# Patient Record
Sex: FEMALE | Race: WHITE | NOT HISPANIC OR LATINO | Employment: FULL TIME | URBAN - METROPOLITAN AREA
[De-identification: names, ages, dates, MRNs, and addresses within clinical notes are randomized per-mention and may not be internally consistent; named-entity substitution may affect disease eponyms.]

---

## 2017-01-09 ENCOUNTER — PRENATAL OFFICE VISIT (OUTPATIENT)
Dept: MIDWIFE SERVICES | Facility: CLINIC | Age: 37
End: 2017-01-09
Payer: COMMERCIAL

## 2017-01-09 VITALS
WEIGHT: 161 LBS | DIASTOLIC BLOOD PRESSURE: 60 MMHG | OXYGEN SATURATION: 99 % | HEIGHT: 68 IN | BODY MASS INDEX: 24.4 KG/M2 | RESPIRATION RATE: 16 BRPM | SYSTOLIC BLOOD PRESSURE: 120 MMHG | TEMPERATURE: 97.2 F | HEART RATE: 120 BPM

## 2017-01-09 DIAGNOSIS — Z34.82 OTHER NORMAL PREGNANCY, NOT FIRST, SECOND TRIMESTER: Primary | ICD-10-CM

## 2017-01-09 PROCEDURE — 99207 ZZC PRENATAL VISIT: CPT | Performed by: ADVANCED PRACTICE MIDWIFE

## 2017-01-09 NOTE — PROGRESS NOTES
19w5d  Feeling much better. Starting to feel her baby move. Wants to take the 3 hour GTT r/t failing the GCT during first pregnancy. Ordered as future. Has [facility] level II survey this Wednesday. MIGUEL AJ

## 2017-01-09 NOTE — NURSING NOTE
"Chief Complaint   Patient presents with     Prenatal Care     initial /60 mmHg  Pulse 120  Temp(Src) 97.2  F (36.2  C) (Oral)  Resp 16  Ht 5' 8\" (1.727 m)  Wt 161 lb (73.029 kg)  BMI 24.49 kg/m2  SpO2 99%  LMP 08/24/2016 Estimated body mass index is 24.49 kg/(m^2) as calculated from the following:    Height as of this encounter: 5' 8\" (1.727 m).    Weight as of this encounter: 161 lb (73.029 kg).  BP completed using cuff size: regular.  L  arm      Health Maintenance that is potentially due pending provider review:  NONE    n/a    Daniele Gallegos ma  "

## 2017-01-11 ENCOUNTER — HOSPITAL ENCOUNTER (OUTPATIENT)
Dept: ULTRASOUND IMAGING | Facility: CLINIC | Age: 37
Discharge: HOME | End: 2017-01-11
Attending: OBSTETRICS & GYNECOLOGY | Admitting: OBSTETRICS & GYNECOLOGY
Payer: COMMERCIAL

## 2017-01-11 ENCOUNTER — OFFICE VISIT (OUTPATIENT)
Dept: MATERNAL FETAL MEDICINE | Facility: CLINIC | Age: 37
End: 2017-01-11
Attending: OBSTETRICS & GYNECOLOGY
Payer: COMMERCIAL

## 2017-01-11 DIAGNOSIS — O09.522 AMA (ADVANCED MATERNAL AGE) MULTIGRAVIDA 35+, SECOND TRIMESTER: Primary | ICD-10-CM

## 2017-01-11 DIAGNOSIS — O09.529 AMA (ADVANCED MATERNAL AGE) MULTIGRAVIDA 35+, UNSPECIFIED TRIMESTER: ICD-10-CM

## 2017-01-11 PROCEDURE — 76811 OB US DETAILED SNGL FETUS: CPT

## 2017-01-11 NOTE — PROGRESS NOTES
"Please see \"Imaging\" tab under \"Chart Review\" for details of today's US at the [facility].    Neo Green MD  Maternal-Fetal Medicine      "

## 2017-01-13 ENCOUNTER — TELEPHONE (OUTPATIENT)
Dept: MATERNAL FETAL MEDICINE | Facility: CLINIC | Age: 37
End: 2017-01-13

## 2017-01-13 NOTE — TELEPHONE ENCOUNTER
Carmen had called our office yesterday stating that she received a letter (not a bill yet) from [employer] that the [employer] was not covered because [facility] is apparently out of network.  She said that genetic testing was covered, but the lab facility apparently was out of network.  I called the testing lab ([facility]) to inquire further and was informed that they are currently working on an appeal to [employer] and that it may take some time before the billing is sorted out.  They also informed me that [facility] lab should be considered in-network with [employer].  I also reminded Carmen that when she finally receives a bill for the testing, she can call [facility] lab and ask for a discounted rate which would bring her potential out of pocket even lower.  I encouraged Carmen to call me back if she has further questions.      Lynn Wilson MS, [facility]  Certified Genetic Counselor  January 13, 2017  9:24 AM

## 2017-02-20 ENCOUNTER — PRENATAL OFFICE VISIT (OUTPATIENT)
Dept: MIDWIFE SERVICES | Facility: CLINIC | Age: 37
End: 2017-02-20
Payer: COMMERCIAL

## 2017-02-20 VITALS
TEMPERATURE: 98 F | DIASTOLIC BLOOD PRESSURE: 62 MMHG | HEIGHT: 68 IN | BODY MASS INDEX: 25.58 KG/M2 | HEART RATE: 78 BPM | WEIGHT: 168.8 LBS | SYSTOLIC BLOOD PRESSURE: 102 MMHG

## 2017-02-20 DIAGNOSIS — Z34.82 ENCOUNTER FOR SUPERVISION OF OTHER NORMAL PREGNANCY IN SECOND TRIMESTER: Primary | ICD-10-CM

## 2017-02-20 LAB — HGB BLD-MCNC: 11.6 G/DL (ref 11.7–15.7)

## 2017-02-20 PROCEDURE — 82950 GLUCOSE TEST: CPT | Performed by: ADVANCED PRACTICE MIDWIFE

## 2017-02-20 PROCEDURE — 99207 ZZC PRENATAL VISIT: CPT | Performed by: ADVANCED PRACTICE MIDWIFE

## 2017-02-20 PROCEDURE — 36415 COLL VENOUS BLD VENIPUNCTURE: CPT | Performed by: ADVANCED PRACTICE MIDWIFE

## 2017-02-20 PROCEDURE — 00000218 ZZHCL STATISTIC OBHBG - HEMOGLOBIN: Performed by: ADVANCED PRACTICE MIDWIFE

## 2017-02-20 NOTE — NURSING NOTE
"No chief complaint on file.      Initial /62  Pulse 78  Temp 98  F (36.7  C)  Ht 5' 8\" (1.727 m)  Wt 168 lb 12.8 oz (76.6 kg)  LMP 08/24/2016  BMI 25.67 kg/m2 Estimated body mass index is 25.67 kg/(m^2) as calculated from the following:    Height as of this encounter: 5' 8\" (1.727 m).    Weight as of this encounter: 168 lb 12.8 oz (76.6 kg).  Medication Reconciliation: complete    "

## 2017-02-20 NOTE — PROGRESS NOTES
25w5d  Patient feeling well. Positive fetal movement. Denies water leaking, vaginal bleeding, decreased fetal movement, contraction pain, or headaches.   Patient is doing well. Not too many questions today. She plans to start to think about the birth but since she still has time has been concentrating more on her son at home than this pregnancy.   Decided to do the GCT instead of the GTT. Doing that today and HGB.   Danger signs reviewed, pre-eclampsia signs and symptoms discussed.   Knows when to call triage and has phone numbers.   Follow up in 4 weeks.   Chantell HOBSON

## 2017-02-20 NOTE — MR AVS SNAPSHOT
"              After Visit Summary   2017    Carmen Dorsey    MRN: [id number]           Patient Information     Date Of Birth          1980        Visit Information        Provider Department      2017 3:30 PM Chantell Dey APRN CNM [facility] [place]        Today's Diagnoses     Encounter for supervision of other normal pregnancy in second trimester    -  1       Follow-ups after your visit        Who to contact     If you have questions or need follow up information about today's clinic visit or your schedule please contact [facility] [place] directly at [phone].  Normal or non-critical lab and imaging results will be communicated to you by 1Energy Systemshart, letter or phone within 4 business days after the clinic has received the results. If you do not hear from us within 7 days, please contact the clinic through 1[employer] Systemshart or phone. If you have a critical or abnormal lab result, we will notify you by phone as soon as possible.  Submit refill requests through [employer] or call your pharmacy and they will forward the refill request to us. Please allow 3 business days for your refill to be completed.          Additional Information About Your Visit        MyChart Information     [employer] lets you send messages to your doctor, view your test results, renew your prescriptions, schedule appointments and more. To sign up, go to [email or web address] . Click on \"Log in\" on the left side of the screen, which will take you to the Welcome page. Then click on \"Sign up Now\" on the right side of the page.     You will be asked to enter the access code listed below, as well as some personal information. Please follow the directions to create your username and password.     Your access code is: [email or web address]  Expires: 2017  3:47 PM     Your access code will  in 90 days. If you need help or a new code, please call your [facility] or [phone].        Care EveryWhere ID     This " "is your Care EveryWhere ID. This could be used by other organizations to access your [place] medical records  [id number]        Your Vitals Were     Pulse Temperature Height Last Period BMI (Body Mass Index)       78 98  F (36.7  C) 5' 8\" (1.727 m) 08/24/2016 25.67 kg/m2        Blood Pressure from Last 3 Encounters:   02/20/17 102/62   01/09/17 120/60   12/05/16 116/71    Weight from Last 3 Encounters:   02/20/17 168 lb 12.8 oz (76.6 kg)   01/09/17 161 lb (73 kg)   12/05/16 152 lb (68.9 kg)              We Performed the Following     Glucose tolerance, gest screen, 1 hour     OB hemoglobin        Primary Care Provider Office Phone # Fax #    JENA Young [facility] [phone]       [facility] [address] [facility] [address]  [facility] [address]        Thank you!     Thank you for choosing [facility] [place]  for your care. Our goal is always to provide you with excellent care. Hearing back from our patients is one way we can continue to improve our services. Please take a few minutes to complete the written survey that you may receive in the mail after your visit with us. Thank you!             Your Updated Medication List - Protect others around you: Learn how to safely use, store and throw away your medicines at www.disposemymeds.org.          This list is accurate as of: 2/20/17  3:47 PM.  Always use your most recent med list.                   Brand Name Dispense Instructions for use    EPINEPHrine 0.3 MG/0.3ML injection     0.6 mL    Inject 0.3 mLs (0.3 mg) into the muscle as needed for anaphylaxis       FAMCICLOVIR PO      Take 250 mg by mouth       miconazole 2 % cream    CVS MICONAZOLE 7    45 g    Place 1 applicator vaginally At Bedtime       TRIAMCINOLONE ACETONIDE (TOP) 0.1 % Crea     60    apply to area three times per day       * VALTREX 500 MG tablet   Generic drug:  valACYclovir     30    4 tablets in in AM and 4 tablets in PM for one day prn cold sore       * " valACYclovir 1000 mg tablet    VALTREX    4 tablet    Take 2 tablets (2,000 mg) by mouth 2 times daily       * valACYclovir 500 MG tablet    VALTREX    20 tablet    Take 1 tablet (500 mg) by mouth 2 times daily       * Notice:  This list has 3 medication(s) that are the same as other medications prescribed for you. Read the directions carefully, and ask your doctor or other care provider to review them with you.

## 2017-02-21 LAB — GLUCOSE 1H P 50 G GLC PO SERPL-MCNC: 124 MG/DL (ref 60–129)

## 2017-03-27 ENCOUNTER — PRENATAL OFFICE VISIT (OUTPATIENT)
Dept: MIDWIFE SERVICES | Facility: CLINIC | Age: 37
End: 2017-03-27
Payer: COMMERCIAL

## 2017-03-27 VITALS
DIASTOLIC BLOOD PRESSURE: 70 MMHG | WEIGHT: 171.6 LBS | HEIGHT: 68 IN | BODY MASS INDEX: 26.01 KG/M2 | HEART RATE: 79 BPM | SYSTOLIC BLOOD PRESSURE: 126 MMHG

## 2017-03-27 DIAGNOSIS — Z34.83 OTHER NORMAL PREGNANCY, NOT FIRST, THIRD TRIMESTER: Primary | ICD-10-CM

## 2017-03-27 PROCEDURE — 99207 ZZC PRENATAL VISIT: CPT | Performed by: ADVANCED PRACTICE MIDWIFE

## 2017-03-27 NOTE — MR AVS SNAPSHOT
"              After Visit Summary   3/27/2017    Carmen Dorsey    MRN: [id number]           Patient Information     Date Of Birth          1980        Visit Information        Provider Department      3/27/2017 1:15 PM Gretchen Washington APRN CNM [facility] [place]        Today's Diagnoses     Other normal pregnancy, not first, third trimester    -  1       Follow-ups after your visit        Who to contact     If you have questions or need follow up information about today's clinic visit or your schedule please contact [facility] [place] directly at [phone].  Normal or non-critical lab and imaging results will be communicated to you by Fre[employer]inhart, letter or phone within 4 business days after the clinic has received the results. If you do not hear from us within 7 days, please contact the clinic through Freedu.inhart or phone. If you have a critical or abnormal lab result, we will notify you by phone as soon as possible.  Submit refill requests through [employer] or call your pharmacy and they will forward the refill request to us. Please allow 3 business days for your refill to be completed.          Additional Information About Your Visit        MyChart Information     [employer] lets you send messages to your doctor, view your test results, renew your prescriptions, schedule appointments and more. To sign up, go to [email or web address] . Click on \"Log in\" on the left side of the screen, which will take you to the Welcome page. Then click on \"Sign up Now\" on the right side of the page.     You will be asked to enter the access code listed below, as well as some personal information. Please follow the directions to create your username and password.     Your access code is: [email or web address]  Expires: 2017  4:47 PM     Your access code will  in 90 days. If you need help or a new code, please call your [facility] or [phone].        Care EveryWhere ID     This is your Care " "EveryWhere ID. This could be used by other organizations to access your [place] medical records  [id number]        Your Vitals Were     Pulse Height Last Period BMI (Body Mass Index)          79 5' 8\" (1.727 m) 08/24/2016 26.09 kg/m2         Blood Pressure from Last 3 Encounters:   03/27/17 126/70   02/20/17 102/62   01/09/17 120/60    Weight from Last 3 Encounters:   03/27/17 171 lb 9.6 oz (77.8 kg)   02/20/17 168 lb 12.8 oz (76.6 kg)   01/09/17 161 lb (73 kg)              Today, you had the following     No orders found for display       Primary Care Provider Office Phone # Fax #    JENA Young YONG [phone]       [facility] [address] [facility] [address]  [facility] [address]        Thank you!     Thank you for choosing [facility]  for your care. Our goal is always to provide you with excellent care. Hearing back from our patients is one way we can continue to improve our services. Please take a few minutes to complete the written survey that you may receive in the mail after your visit with us. Thank you!             Your Updated Medication List - Protect others around you: Learn how to safely use, store and throw away your medicines at www.disposemymeds.org.          This list is accurate as of: 3/27/17  1:53 PM.  Always use your most recent med list.                   Brand Name Dispense Instructions for use    EPINEPHrine 0.3 MG/0.3ML injection     0.6 mL    Inject 0.3 mLs (0.3 mg) into the muscle as needed for anaphylaxis       FAMCICLOVIR PO      Take 250 mg by mouth       miconazole 2 % cream    CVS MICONAZOLE 7    45 g    Place 1 applicator vaginally At Bedtime       TRIAMCINOLONE ACETONIDE (TOP) 0.1 % Crea     60    apply to area three times per day       * VALTREX 500 MG tablet   Generic drug:  valACYclovir     30    4 tablets in in AM and 4 tablets in PM for one day prn cold sore       * valACYclovir 1000 mg tablet    VALTREX    4 tablet    Take 2 tablets " (2,000 mg) by mouth 2 times daily       * valACYclovir 500 MG tablet    VALTREX    20 tablet    Take 1 tablet (500 mg) by mouth 2 times daily       * Notice:  This list has 3 medication(s) that are the same as other medications prescribed for you. Read the directions carefully, and ask your doctor or other care provider to review them with you.

## 2017-03-27 NOTE — PROGRESS NOTES
30w5d  Here with son, Naun. Leaving this week for a trip to [place]. Discussed travel precautions. Gave phone # to call the birthplace for a tour. Has questions about making a plan for breastfeeding. Reports a history of breast reduction surgery and low milk production while breastfeeding first son. She was using an industrial pump and dom peridon with good results last time. Wants to secure similar resources with this baby, too. I will discuss with LC from hospital about a plan. Reports good fetal movement. Denies leaking of fluid, vaginal bleeding, regular uterine contractions, headache or other concerns.  RTC in 2 wks YOSVANY

## 2017-04-10 ENCOUNTER — PRENATAL OFFICE VISIT (OUTPATIENT)
Dept: MIDWIFE SERVICES | Facility: CLINIC | Age: 37
End: 2017-04-10
Payer: COMMERCIAL

## 2017-04-10 VITALS
HEART RATE: 107 BPM | WEIGHT: 171 LBS | DIASTOLIC BLOOD PRESSURE: 70 MMHG | BODY MASS INDEX: 26 KG/M2 | SYSTOLIC BLOOD PRESSURE: 120 MMHG

## 2017-04-10 DIAGNOSIS — Z23 NEED FOR TDAP VACCINATION: Primary | ICD-10-CM

## 2017-04-10 PROCEDURE — 90715 TDAP VACCINE 7 YRS/> IM: CPT | Performed by: ADVANCED PRACTICE MIDWIFE

## 2017-04-10 PROCEDURE — 90471 IMMUNIZATION ADMIN: CPT | Performed by: ADVANCED PRACTICE MIDWIFE

## 2017-04-10 PROCEDURE — 99207 ZZC PRENATAL VISIT: CPT | Performed by: ADVANCED PRACTICE MIDWIFE

## 2017-04-10 NOTE — MR AVS SNAPSHOT
"              After Visit Summary   4/10/2017    Carmen Dorsey    MRN: [id number]           Patient Information     Date Of Birth          1980        Visit Information        Provider Department      4/10/2017 2:00 PM Aida Genao APRN [facility]        Today's Diagnoses     Need for Tdap vaccination    -  1       Follow-ups after your visit        Follow-up notes from your care team     Return in about 2 weeks (around 2017).      Who to contact     If you have questions or need follow up information about today's clinic visit or your schedule please contact [facility] directly at [phone].  Normal or non-critical lab and imaging results will be communicated to you by MyChart, letter or phone within 4 business days after the clinic has received the results. If you do not hear from us within 7 days, please contact the clinic through MyChart or phone. If you have a critical or abnormal lab result, we will notify you by phone as soon as possible.  Submit refill requests through [employer] or call your pharmacy and they will forward the refill request to us. Please allow 3 business days for your refill to be completed.          Additional Information About Your Visit        MyChart Information     [employer] lets you send messages to your doctor, view your test results, renew your prescriptions, schedule appointments and more. To sign up, go to [email or web address] . Click on \"Log in\" on the left side of the screen, which will take you to the Welcome page. Then click on \"Sign up Now\" on the right side of the page.     You will be asked to enter the access code listed below, as well as some personal information. Please follow the directions to create your username and password.     Your access code is: [email or web address]  Expires: 2017  4:47 PM     Your access code will  in 90 days. If you need help or a new code, please call your [facility] or " [phone].        Care EveryWhere ID     This is your Care EveryWhere ID. This could be used by other organizations to access your [place] medical records  [id number]        Your Vitals Were     Pulse Last Period BMI (Body Mass Index)             107 08/24/2016 26 kg/m2          Blood Pressure from Last 3 Encounters:   04/10/17 120/70   03/27/17 126/70   02/20/17 102/62    Weight from Last 3 Encounters:   04/10/17 171 lb (77.6 kg)   03/27/17 171 lb 9.6 oz (77.8 kg)   02/20/17 168 lb 12.8 oz (76.6 kg)              We Performed the Following     TDAP VACCINE (BOOSTRIX)        Primary Care Provider Office Phone # Fax #    JENA Young [phone]       [facility] [address] [facility] [address]  [facility] [address]        Thank you!     Thank you for choosing [facility] [place]  for your care. Our goal is always to provide you with excellent care. Hearing back from our patients is one way we can continue to improve our services. Please take a few minutes to complete the written survey that you may receive in the mail after your visit with us. Thank you!             Your Updated Medication List - Protect others around you: Learn how to safely use, store and throw away your medicines at www.disposemymeds.org.          This list is accurate as of: 4/10/17  2:16 PM.  Always use your most recent med list.                   Brand Name Dispense Instructions for use    EPINEPHrine 0.3 MG/0.3ML injection     0.6 mL    Inject 0.3 mLs (0.3 mg) into the muscle as needed for anaphylaxis       FAMCICLOVIR PO      Take 250 mg by mouth       miconazole 2 % cream    CVS MICONAZOLE 7    45 g    Place 1 applicator vaginally At Bedtime       TRIAMCINOLONE ACETONIDE (TOP) 0.1 % Crea     60    apply to area three times per day       * VALTREX 500 MG tablet   Generic drug:  valACYclovir     30    4 tablets in in AM and 4 tablets in PM for one day prn cold sore       * valACYclovir 1000 mg tablet     VALTREX    4 tablet    Take 2 tablets (2,000 mg) by mouth 2 times daily       * valACYclovir 500 MG tablet    VALTREX    20 tablet    Take 1 tablet (500 mg) by mouth 2 times daily       * Notice:  This list has 3 medication(s) that are the same as other medications prescribed for you. Read the directions carefully, and ask your doctor or other care provider to review them with you.

## 2017-04-10 NOTE — PROGRESS NOTES
32w5d  Pt here with son, Naun.  Concerned about fetus being breech, reassured it is vertex.  Pt will watch carbohydrate portion sizes the last 6 weeks of pregnancy. Would like not to have a 10 lb baby again  Tdap today..  rtc in 2 weeks. jason

## 2017-04-24 ENCOUNTER — PRENATAL OFFICE VISIT (OUTPATIENT)
Dept: MIDWIFE SERVICES | Facility: CLINIC | Age: 37
End: 2017-04-24
Payer: COMMERCIAL

## 2017-04-24 VITALS
BODY MASS INDEX: 26.15 KG/M2 | WEIGHT: 172 LBS | DIASTOLIC BLOOD PRESSURE: 70 MMHG | HEART RATE: 99 BPM | SYSTOLIC BLOOD PRESSURE: 110 MMHG

## 2017-04-24 DIAGNOSIS — Z34.83 ENCOUNTER FOR SUPERVISION OF OTHER NORMAL PREGNANCY IN THIRD TRIMESTER: Primary | ICD-10-CM

## 2017-04-24 PROCEDURE — 99207 ZZC PRENATAL VISIT: CPT | Performed by: ADVANCED PRACTICE MIDWIFE

## 2017-04-24 NOTE — MR AVS SNAPSHOT
"              After Visit Summary   2017    Carmen Dorsey    MRN: [id number]           Patient Information     Date Of Birth          1980        Visit Information        Provider Department      2017 1:30 PM Chantell Dey APRN CNM [facility] [place]        Today's Diagnoses     Encounter for supervision of other normal pregnancy in third trimester    -  1       Follow-ups after your visit        Who to contact     If you have questions or need follow up information about today's clinic visit or your schedule please contact [facility] [place] directly at [phone].  Normal or non-critical lab and imaging results will be communicated to you by Coherent Labshart, letter or phone within 4 business days after the clinic has received the results. If you do not hear from us within 7 days, please contact the clinic through Coherent Labshart or phone. If you have a critical or abnormal lab result, we will notify you by phone as soon as possible.  Submit refill requests through [employer] or call your pharmacy and they will forward the refill request to us. Please allow 3 business days for your refill to be completed.          Additional Information About Your Visit        MyChart Information     [employer] lets you send messages to your doctor, view your test results, renew your prescriptions, schedule appointments and more. To sign up, go to [email or web address] . Click on \"Log in\" on the left side of the screen, which will take you to the Welcome page. Then click on \"Sign up Now\" on the right side of the page.     You will be asked to enter the access code listed below, as well as some personal information. Please follow the directions to create your username and password.     Your access code is: [email or web address]  Expires: 2017  4:47 PM     Your access code will  in 90 days. If you need help or a new code, please call your [facility] or [phone].        Care EveryWhere ID     This is " your Care EveryWhere ID. This could be used by other organizations to access your [place] medical records  [id number]        Your Vitals Were     Pulse Last Period BMI (Body Mass Index)             99 08/24/2016 26.15 kg/m2          Blood Pressure from Last 3 Encounters:   04/24/17 110/70   04/10/17 120/70   03/27/17 126/70    Weight from Last 3 Encounters:   04/24/17 172 lb (78 kg)   04/10/17 171 lb (77.6 kg)   03/27/17 171 lb 9.6 oz (77.8 kg)              Today, you had the following     No orders found for display       Primary Care Provider Office Phone # Fax #    JENA Young [facility] [phone]       [facility] [address] [facility] [address]  [facility] [address]        Thank you!     Thank you for choosing [facility] [place]  for your care. Our goal is always to provide you with excellent care. Hearing back from our patients is one way we can continue to improve our services. Please take a few minutes to complete the written survey that you may receive in the mail after your visit with us. Thank you!             Your Updated Medication List - Protect others around you: Learn how to safely use, store and throw away your medicines at www.disposemymeds.org.          This list is accurate as of: 4/24/17  2:18 PM.  Always use your most recent med list.                   Brand Name Dispense Instructions for use    EPINEPHrine 0.3 MG/0.3ML injection     0.6 mL    Inject 0.3 mLs (0.3 mg) into the muscle as needed for anaphylaxis       FAMCICLOVIR PO      Take 250 mg by mouth       miconazole 2 % cream    CVS MICONAZOLE 7    45 g    Place 1 applicator vaginally At Bedtime       TRIAMCINOLONE ACETONIDE (TOP) 0.1 % Crea     60    apply to area three times per day       * VALTREX 500 MG tablet   Generic drug:  valACYclovir     30    4 tablets in in AM and 4 tablets in PM for one day prn cold sore       * valACYclovir 1000 mg tablet    VALTREX    4 tablet    Take 2 tablets (2,000 mg) by  mouth 2 times daily       * valACYclovir 500 MG tablet    VALTREX    20 tablet    Take 1 tablet (500 mg) by mouth 2 times daily       * Notice:  This list has 3 medication(s) that are the same as other medications prescribed for you. Read the directions carefully, and ask your doctor or other care provider to review them with you.

## 2017-04-24 NOTE — PROGRESS NOTES
34w5d   Patient feeling well. Positive fetal movement. Denies water leaking, vaginal bleeding, decreased fetal movement, contraction pain, or headaches.   Patient doing well, here with Naun. No concerns. Has her birth plan, reviewed. Same as the one with Naun, and mostly standard practice. Growth on track with dates, worried about having another really large baby. Signed the water birth consent today. Draw labs with 36 week visit.   Danger signs reviewed, pre-eclampsia signs and symptoms discussed.   Knows when to call triage and has phone numbers.   Follow up in 1 week. Hepatitis C for water birth with HGB and GBS.   Chantell HOBSON

## 2017-05-01 ENCOUNTER — PRENATAL OFFICE VISIT (OUTPATIENT)
Dept: MIDWIFE SERVICES | Facility: CLINIC | Age: 37
End: 2017-05-01
Payer: COMMERCIAL

## 2017-05-01 VITALS
BODY MASS INDEX: 26.51 KG/M2 | HEART RATE: 74 BPM | HEIGHT: 68 IN | SYSTOLIC BLOOD PRESSURE: 96 MMHG | WEIGHT: 174.9 LBS | DIASTOLIC BLOOD PRESSURE: 59 MMHG

## 2017-05-01 DIAGNOSIS — Z23 NEED FOR TDAP VACCINATION: ICD-10-CM

## 2017-05-01 DIAGNOSIS — Z34.83 ENCOUNTER FOR SUPERVISION OF OTHER NORMAL PREGNANCY IN THIRD TRIMESTER: Primary | ICD-10-CM

## 2017-05-01 LAB — HGB BLD-MCNC: 11.7 G/DL (ref 11.7–15.7)

## 2017-05-01 PROCEDURE — 00000218 ZZHCL STATISTIC OBHBG - HEMOGLOBIN: Performed by: ADVANCED PRACTICE MIDWIFE

## 2017-05-01 PROCEDURE — 36415 COLL VENOUS BLD VENIPUNCTURE: CPT | Performed by: ADVANCED PRACTICE MIDWIFE

## 2017-05-01 PROCEDURE — 86803 HEPATITIS C AB TEST: CPT | Performed by: ADVANCED PRACTICE MIDWIFE

## 2017-05-01 PROCEDURE — 99207 ZZC PRENATAL VISIT: CPT | Performed by: ADVANCED PRACTICE MIDWIFE

## 2017-05-01 NOTE — PROGRESS NOTES
35w5d   Patient feeling well. Positive fetal movement. Denies water leaking, vaginal bleeding, decreased fetal movement, contraction pain, or headaches.   Patient has no concerns today. Asking about postpartum resources. Believes for about 6 weeks postpartum with her son she had postpartum depression. She sees a therapist who she can connect with afterwards. We also discussed she can contact the midwives sooner and also use her pediatrician for a resource to talk with if she has concerns about depression. We also spoke about starting medication before so the 4-6 weeks it takes to work will be before the delivery. She is unsure if she wants to take medication. Thinking she may wait to see how it goes and then start after if she has postpartum depression again.   On leapolds difficult to tell position. Feels most of her kicks up high and heart heart in left lower quadrant. Discussed needing to go to [place] to have a BSUS at her next visit to make sure she does not need to discuss/have a version at 37 weeks. Will try and get in this week or Monday. Needs GBS at her next visit also. Hepatitis C and HGB drawn today.   Danger signs reviewed, pre-eclampsia signs and symptoms discussed.   Knows when to call triage and has phone numbers.   Follow up in 1 week.   Chantell Dey CNM

## 2017-05-01 NOTE — MR AVS SNAPSHOT
"              After Visit Summary   5/1/2017    Carmen Dorsey    MRN: [id number]           Patient Information     Date Of Birth          1980        Visit Information        Provider Department      5/1/2017 1:00 PM [facility]        Today's Diagnoses     Encounter for supervision of other normal pregnancy in third trimester    -  1    Need for Tdap vaccination           Follow-ups after your visit        Your next 10 appointments already scheduled     May 08, 2017  1:00 PM CDT   [facility]    [address] [address] [phone]            May 15, 2017  1:00 PM CDT   [facility]ABL[facility]    [address] [address] [phone]              Who to contact     If you have questions or need follow up information about today's clinic visit or your schedule please contact [facility] directly at [phone].  Normal or non-critical lab and imaging results will be communicated to you by MyChart, letter or phone within 4 business days after the clinic has received the results. If you do not hear from us within 7 days, please contact the clinic through Navio Healthhart or phone. If you have a critical or abnormal lab result, we will notify you by phone as soon as possible.  Submit refill requests through Orbeus or call your pharmacy and they will forward the refill request to us. Please allow 3 business days for your refill to be completed.          Additional Information About Your Visit        MyChart Information     Orbeus lets you send messages to your doctor, view your test results, renew your prescriptions, schedule appointments and more. To sign up, go to [email or web address] . Click on \"Log in\" on the left side of " "the screen, which will take you to the Welcome page. Then click on \"Sign up Now\" on the right side of the page.     You will be asked to enter the access code listed below, as well as some personal information. Please follow the directions to create your username and password.     Your access code is: [email or web address]  Expires: 2017  4:47 PM     Your access code will  in 90 days. If you need help or a new code, please call your [place] clinic or [phone].        Care EveryWhere ID     This is your Care EveryWhere ID. This could be used by other organizations to access your [place] medical records  [id number]        Your Vitals Were     Pulse Height Last Period BMI (Body Mass Index)          74 5' 8\" (1.727 m) 2016 26.59 kg/m2         Blood Pressure from Last 3 Encounters:   17 96/59   17 110/70   04/10/17 120/70    Weight from Last 3 Encounters:   17 174 lb 14.4 oz (79.3 kg)   17 172 lb (78 kg)   04/10/17 171 lb (77.6 kg)              We Performed the Following     Hepatitis C antibody     OB hemoglobin        Primary Care Provider Office Phone # Fax #    JENA Young [phone]       [facility] [address] [facility] [address]  [facility] [address]        Thank you!     Thank you for choosing [facility]  for your care. Our goal is always to provide you with excellent care. Hearing back from our patients is one way we can continue to improve our services. Please take a few minutes to complete the written survey that you may receive in the mail after your visit with us. Thank you!             Your Updated Medication List - Protect others around you: Learn how to safely use, store and throw away your medicines at www.disposemymeds.org.          This list is accurate as of: 17  1:49 PM.  Always use your most recent med list.                   Brand Name Dispense Instructions for use    EPINEPHrine 0.3 MG/0.3ML injection     0.6 " mL    Inject 0.3 mLs (0.3 mg) into the muscle as needed for anaphylaxis       FAMCICLOVIR PO      Take 250 mg by mouth       miconazole 2 % cream    CVS MICONAZOLE 7    45 g    Place 1 applicator vaginally At Bedtime       TRIAMCINOLONE ACETONIDE (TOP) 0.1 % Crea     60    apply to area three times per day       * VALTREX 500 MG tablet   Generic drug:  valACYclovir     30    4 tablets in in AM and 4 tablets in PM for one day prn cold sore       * valACYclovir 1000 mg tablet    VALTREX    4 tablet    Take 2 tablets (2,000 mg) by mouth 2 times daily       * valACYclovir 500 MG tablet    VALTREX    20 tablet    Take 1 tablet (500 mg) by mouth 2 times daily       * Notice:  This list has 3 medication(s) that are the same as other medications prescribed for you. Read the directions carefully, and ask your doctor or other care provider to review them with you.

## 2017-05-01 NOTE — NURSING NOTE
"Chief Complaint   Patient presents with     Prenatal Care       Initial BP 96/59  Pulse 74  Ht 5' 8\" (1.727 m)  Wt 174 lb 14.4 oz (79.3 kg)  LMP 08/24/2016  BMI 26.59 kg/m2 Estimated body mass index is 26.59 kg/(m^2) as calculated from the following:    Height as of this encounter: 5' 8\" (1.727 m).    Weight as of this encounter: 174 lb 14.4 oz (79.3 kg).  Medication Reconciliation: complete    "

## 2017-05-02 LAB — HCV AB SERPL QL IA: NORMAL

## 2017-05-04 ENCOUNTER — TELEPHONE (OUTPATIENT)
Dept: FAMILY MEDICINE | Facility: CLINIC | Age: 37
End: 2017-05-04

## 2017-05-04 NOTE — TELEPHONE ENCOUNTER
"Could you please reach out the patient for me as initial contact?  See the midwifery note below. I am not going to have a chance to call today, but I should tomorrow.  It would be helpful to have some more detail on the background and specific questions that patient has.  Typically patients make an appointment with me for this prenatally and patient is definitely free to do this, but perhaps her questions are very simple.  ZENAIDA Solis, CLAUDIA     \"Hello,   This patient had a breast reduction and had trouble feeding her first baby. She felt like she really struggled and had postpartum depression which her lactation problems attributed to. She knows there is a good chance the same problems will arise and is just looking for someone to support her through it. She said she feels very educated around lactation and her issues but wants someone to meet occasionally afterwards.   She was hoping you could give her a call and connect with her prior to her delivery. She would love to hear from you this week when you get a chance.   Thank you! Tressa \"  "

## 2017-05-04 NOTE — TELEPHONE ENCOUNTER
Telephone call to patient. She is expecting a girl on May 31st 2017. She is feeling good and strong. She reports that she is looking for a lactation consultant to provide postpartum support to her as she would like to breastfeed her daughter.     She has a three year old son who weighed 10 lbs 9 ounces at birth. She reports that some of the challenges related to breastfeeding were that her son was tongue tied, difficulty with latch and sucking (more of a clamping) which caused her to have blisters on her nipples. She reports having a breast reduction in 2001 in [place], [place]. She reports limited sensation on the side of her breast, not the nipple. Also, that production was decreased due to less intact ducts from surgery.     With her son, she tried supplemental nursing devices (tubing next to breast), pumping, Domperidone and supplementing with formula.     Routing to Elena Solis CNP. She requests call at your convenience.       Thank you,   Rocio Slater RN  [facility]

## 2017-05-05 NOTE — TELEPHONE ENCOUNTER
Wants to make sure she can do what she can ahead of time and be prepared.  With son, had postpartum and breastfeeding was a particular sore point.  Was able to nurse one year and estimates he got 10% breastmilk.  Son is being seen at [facility].  Unsure if they have familiarity or check tongue ties.  Discussed taking Go-Lacta 1 TID starting now and possibly Goat's Rue.  Call [facility] for supplements and hospital grade pump rentals.  There are no compounding pharmacies making domperidone, so reviewed internet resources for purchasing domperidone.  CLAUDIA uH

## 2017-05-08 ENCOUNTER — PRENATAL OFFICE VISIT (OUTPATIENT)
Dept: MIDWIFE SERVICES | Facility: CLINIC | Age: 37
End: 2017-05-08
Payer: COMMERCIAL

## 2017-05-08 VITALS
SYSTOLIC BLOOD PRESSURE: 112 MMHG | TEMPERATURE: 97 F | WEIGHT: 176.5 LBS | HEART RATE: 82 BPM | DIASTOLIC BLOOD PRESSURE: 64 MMHG | BODY MASS INDEX: 26.84 KG/M2

## 2017-05-08 DIAGNOSIS — B00.1 COLD SORE: ICD-10-CM

## 2017-05-08 DIAGNOSIS — Z34.83 ENCOUNTER FOR SUPERVISION OF OTHER NORMAL PREGNANCY, THIRD TRIMESTER: Primary | ICD-10-CM

## 2017-05-08 DIAGNOSIS — Z91.89 AT RISK FOR INEFFECTIVE BREASTFEEDING: ICD-10-CM

## 2017-05-08 PROCEDURE — 87653 STREP B DNA AMP PROBE: CPT | Performed by: ADVANCED PRACTICE MIDWIFE

## 2017-05-08 PROCEDURE — 99207 ZZC PRENATAL VISIT: CPT | Performed by: ADVANCED PRACTICE MIDWIFE

## 2017-05-08 RX ORDER — VALACYCLOVIR HYDROCHLORIDE 1 G/1
1000 TABLET, FILM COATED ORAL 2 TIMES DAILY
Qty: 30 TABLET | Refills: 1 | Status: ON HOLD | OUTPATIENT
Start: 2017-05-08 | End: 2017-06-06

## 2017-05-08 NOTE — MR AVS SNAPSHOT
"              After Visit Summary   5/8/2017    Carmen Dorsey    MRN: [id number]           Patient Information     Date Of Birth          1980        Visit Information        Provider Department      5/8/2017 12:15 PM Polina Sykes APRN CNM [facility]        Today's Diagnoses     Encounter for supervision of other normal pregnancy, third trimester    -  1    Cold sore        At risk for ineffective breastfeeding           Follow-ups after your visit        Your next 10 appointments already scheduled     May 15, 2017  1:00 PM CDT   ESTABLISHED PRENATAL with JENA Howard CNM   [facility]    [address] [address] [phone]              Who to contact     If you have questions or need follow up information about today's clinic visit or your schedule please contact [facility] directly at [phone].  Normal or non-critical lab and imaging results will be communicated to you by eBreviahart, letter or phone within 4 business days after the clinic has received the results. If you do not hear from us within 7 days, please contact the clinic through eBreviahart or phone. If you have a critical or abnormal lab result, we will notify you by phone as soon as possible.  Submit refill requests through Crunchfish or call your pharmacy and they will forward the refill request to us. Please allow 3 business days for your refill to be completed.          Additional Information About Your Visit        eBreviahart Information     Crunchfish lets you send messages to your doctor, view your test results, renew your prescriptions, schedule appointments and more. To sign up, go to [email or web address] . Click on \"Log in\" on the left side of the screen, which will take you to the Welcome page. Then click on \"Sign up Now\" on the right side of the page.     You will be asked to enter the access code listed below, as well as " some personal information. Please follow the directions to create your username and password.     Your access code is: [email or web address]  Expires: 2017  4:47 PM     Your access code will  in 90 days. If you need help or a new code, please call your [place] clinic or [phone].        Care EveryWhere ID     This is your Care EveryWhere ID. This could be used by other organizations to access your [place] medical records  [id number]        Your Vitals Were     Pulse Temperature Last Period BMI (Body Mass Index)          82 97  F (36.1  C) (Oral) 2016 26.84 kg/m2         Blood Pressure from Last 3 Encounters:   17 112/64   17 96/59   17 110/70    Weight from Last 3 Encounters:   17 176 lb 8 oz (80.1 kg)   17 174 lb 14.4 oz (79.3 kg)   17 172 lb (78 kg)              We Performed the Following     Group B strep PCR     OB hemoglobin          Today's Medication Changes          These changes are accurate as of: 17  2:04 PM.  If you have any questions, ask your nurse or doctor.               Start taking these medicines.        Dose/Directions    order for DME   Used for:  At risk for ineffective breastfeeding   Started by:  Polina Sykes APRN CNM        Please dispense one hospital grade breast pump for rental from [facility] lactation services   Quantity:  1 Device   Refills:  0         These medicines have changed or have updated prescriptions.        Dose/Directions    * VALTREX 500 MG tablet   This may have changed:  Another medication with the same name was changed. Make sure you understand how and when to take each.   Used for:  Herpes simplex without mention of complication   Generic drug:  valACYclovir   Changed by:  Cece Ochoa, DO        4 tablets in in AM and 4 tablets in PM for one day prn cold sore   Quantity:  30   Refills:  6       * valACYclovir 500 MG tablet   Commonly known as:  VALTREX   This may have changed:  Another  medication with the same name was changed. Make sure you understand how and when to take each.   Used for:  Cold sore   Changed by:  Aida Genao APRN CNM        Dose:  500 mg   Take 1 tablet (500 mg) by mouth 2 times daily   Quantity:  20 tablet   Refills:  1       * valACYclovir 1000 mg tablet   Commonly known as:  VALTREX   This may have changed:  how much to take   Used for:  Cold sore   Changed by:  Polina Sykes APRN CNM        Dose:  1000 mg   Take 1 tablet (1,000 mg) by mouth 2 times daily   Quantity:  30 tablet   Refills:  1       * Notice:  This list has 3 medication(s) that are the same as other medications prescribed for you. Read the directions carefully, and ask your doctor or other care provider to review them with you.         Where to get your medicines      These medications were sent to [facility] [address] [place] [address] [address] [facility] [address]  [address] [place] [address]     Phone:  [phone]     valACYclovir 1000 mg tablet         Some of these will need a paper prescription and others can be bought over the counter.  Ask your nurse if you have questions.     Bring a paper prescription for each of these medications     order for DME                Primary Care Provider Office Phone # Fax #    JENA Young [phone]       [facility] [address] [facility] [address]  [facility] [address]        Thank you!     Thank you for choosing [facility]  for your [place]. Our goal is always to provide you with excellent care. Hearing back from our patients is one way we can continue to improve our services. Please take a few minutes to complete the written survey that you may receive in the mail after your visit with us. Thank you!             Your Updated Medication List - Protect others around you: Learn how to safely use, store and throw away your medicines at www.disposemymeds.org.          This list  is accurate as of: 5/8/17  2:04 PM.  Always use your most recent med list.                   Brand Name Dispense Instructions for use    EPINEPHrine 0.3 MG/0.3ML injection     0.6 mL    Inject 0.3 mLs (0.3 mg) into the muscle as needed for anaphylaxis       FAMCICLOVIR PO      Take 250 mg by mouth       miconazole 2 % cream    CVS MICONAZOLE 7    45 g    Place 1 applicator vaginally At Bedtime       order for DME     1 Device    Please dispense one hospital grade breast pump for rental from [facility] lactation services       TRIAMCINOLONE ACETONIDE (TOP) 0.1 % Crea     60    apply to area three times per day       * VALTREX 500 MG tablet   Generic drug:  valACYclovir     30    4 tablets in in AM and 4 tablets in PM for one day prn cold sore       * valACYclovir 500 MG tablet    VALTREX    20 tablet    Take 1 tablet (500 mg) by mouth 2 times daily       * valACYclovir 1000 mg tablet    VALTREX    30 tablet    Take 1 tablet (1,000 mg) by mouth 2 times daily       * Notice:  This list has 3 medication(s) that are the same as other medications prescribed for you. Read the directions carefully, and ask your doctor or other care provider to review them with you.

## 2017-05-08 NOTE — PROGRESS NOTES
Patient presents for prenatal visit with son, Naun. Feeling well, no concerns. Here this week for BSUS to confirm vertex. Baby is vertex, head very low in pelvis and difficult to palpate but visualized on BSUS. GBS today. Reviewed Hgb, Hep C. Baby is active. No regular contractions, LOF or bleeding. Renewed Valtrex rx per patient request. Also asking for rx for hospital grade breast pump rental from [facility] and also a referral for LC at [facility] for insurance purposes. Rx for DME - specified for hospital grade pump rental. Need to look into options for LC referral. RTC weekly. Polina Sykes CNM

## 2017-05-09 ENCOUNTER — TELEPHONE (OUTPATIENT)
Dept: FAMILY MEDICINE | Facility: CLINIC | Age: 37
End: 2017-05-09

## 2017-05-09 ASSESSMENT — PATIENT HEALTH QUESTIONNAIRE - PHQ9: SUM OF ALL RESPONSES TO PHQ QUESTIONS 1-9: 2

## 2017-05-09 NOTE — TELEPHONE ENCOUNTER
Reason for call: Other   Patient called regarding (reason for call): prescription  Additional comments: Pt would like a call back from Elena regarding a medication they had discussed in the previous conversation, domperidone. She stated that Elena had mentioned acquiring it before the birth, but upon further research it seems as though she will need a prescription for it, and to discuss a compounding pharmacy where that would be able to be filled.     Phone Number Pt can be reached at: Home number on file [phone] (home)  Best Time: Any  Can we leave a detailed message on this number? YES

## 2017-05-09 NOTE — TELEPHONE ENCOUNTER
Spoke with pt she is fine with waiting for Elena to return to have her questions answered    Route to provider    Elena celaya review the pt message    Advise as needed    Penelope Velasco RN

## 2017-05-10 LAB
GP B STREP DNA SPEC QL NAA+PROBE: NORMAL
SPECIMEN SOURCE: NORMAL

## 2017-05-10 NOTE — TELEPHONE ENCOUNTER
Spoke with pt. Wants to talk to you about access of Domperidone and get your advice about that.   Ok to call her tomorrow, that's ok. She won't be available tonight.    Yarely Redding RN  [facility]

## 2017-05-10 NOTE — TELEPHONE ENCOUNTER
Would you please contact [facility] and [facility] (I believe it is just called [facility]).  They are the ones that have previously compounded domperidone.  Would you see if either are currently compounding domperidone and, if not, if they have recommendations about anywhere that is.  KCLAUDIA Diaz

## 2017-05-10 NOTE — TELEPHONE ENCOUNTER
Route to provider    Elena  Called both the [facility] pharmacy and [facility] and both stated they aren't able to do the compounding of the domperidone and they don't have any idea who can as the FDA has restricted this compounded medication.    Penelope Velasco RN

## 2017-05-10 NOTE — TELEPHONE ENCOUNTER
Pt states she has more questions for Elena Solis regarding what has been discussed. Pt de;cined to provide more information.

## 2017-05-10 NOTE — TELEPHONE ENCOUNTER
Left message on answering machine for patient to call back.    Yarely Redding RN  [facility]

## 2017-05-10 NOTE — TELEPHONE ENCOUNTER
Left vm on pt personal vm with the info regarding the compound med and that it is no longer being compounded d/t FDA restrictions    Penelope Velasco RN

## 2017-05-15 ENCOUNTER — PRENATAL OFFICE VISIT (OUTPATIENT)
Dept: MIDWIFE SERVICES | Facility: CLINIC | Age: 37
End: 2017-05-15
Payer: COMMERCIAL

## 2017-05-15 VITALS
SYSTOLIC BLOOD PRESSURE: 117 MMHG | WEIGHT: 175 LBS | HEIGHT: 68 IN | BODY MASS INDEX: 26.52 KG/M2 | HEART RATE: 80 BPM | TEMPERATURE: 98 F | DIASTOLIC BLOOD PRESSURE: 74 MMHG

## 2017-05-15 DIAGNOSIS — Z34.83 ENCOUNTER FOR SUPERVISION OF OTHER NORMAL PREGNANCY IN THIRD TRIMESTER: Primary | ICD-10-CM

## 2017-05-15 DIAGNOSIS — Z23 NEED FOR TDAP VACCINATION: ICD-10-CM

## 2017-05-15 PROCEDURE — 99207 ZZC PRENATAL VISIT: CPT | Performed by: ADVANCED PRACTICE MIDWIFE

## 2017-05-15 NOTE — PROGRESS NOTES
37w5d   Patient feeling well. Positive fetal movement. Denies water leaking, vaginal bleeding, decreased fetal movement, contraction pain, or headaches.   Patient has no questions or concerns today. Really happy baby was in a good position. Feeling more uncomfortable. Getting things ready at home and feels like she is ready but also wants to get a few other things done. Discussed GBS negative and good hemoglobin levels. Discussed postdates testing per patient's request.   Danger signs reviewed, pre-eclampsia signs and symptoms discussed.   Knows when to call triage and has phone numbers.   Follow up in 1 week.   Chantell HOBSON

## 2017-05-15 NOTE — NURSING NOTE
"Chief Complaint   Patient presents with     Prenatal Care       Initial /74  Pulse 80  Temp 98  F (36.7  C)  Ht 5' 8\" (1.727 m)  Wt 175 lb (79.4 kg)  LMP 08/24/2016  BMI 26.61 kg/m2 Estimated body mass index is 26.61 kg/(m^2) as calculated from the following:    Height as of this encounter: 5' 8\" (1.727 m).    Weight as of this encounter: 175 lb (79.4 kg).  Medication Reconciliation: complete    "

## 2017-05-15 NOTE — MR AVS SNAPSHOT
"              After Visit Summary   5/15/2017    Carmen Dorsey    MRN: [id number]           Patient Information     Date Of Birth          1980        Visit Information        Provider Department      5/15/2017 1:00 PM Chantell Dey APRN CNM [facility]        Today's Diagnoses     Encounter for supervision of other normal pregnancy in third trimester    -  1    Need for Tdap vaccination           Follow-ups after your visit        Who to contact     If you have questions or need follow up information about today's clinic visit or your schedule please contact [facility] directly at [phone].  Normal or non-critical lab and imaging results will be communicated to you by Aethonhart, letter or phone within 4 business days after the clinic has received the results. If you do not hear from us within 7 days, please contact the clinic through Aethonhart or phone. If you have a critical or abnormal lab result, we will notify you by phone as soon as possible.  Submit refill requests through Placemeter or call your pharmacy and they will forward the refill request to us. Please allow 3 business days for your refill to be completed.          Additional Information About Your Visit        MyChart Information     Placemeter lets you send messages to your doctor, view your test results, renew your prescriptions, schedule appointments and more. To sign up, go to [email or web address] . Click on \"Log in\" on the left side of the screen, which will take you to the Welcome page. Then click on \"Sign up Now\" on the right side of the page.     You will be asked to enter the access code listed below, as well as some personal information. Please follow the directions to create your username and password.     Your access code is: [email or web address]  Expires: 2017  4:47 PM     Your access code will  in 90 days. If you need help or a new code, please call your [place] clinic or [phone].      " "  Care EveryWhere ID     This is your Care EveryWhere ID. This could be used by other organizations to access your [place] medical records  [id number]        Your Vitals Were     Pulse Temperature Height Last Period BMI (Body Mass Index)       80 98  F (36.7  C) 5' 8\" (1.727 m) 08/24/2016 26.61 kg/m2        Blood Pressure from Last 3 Encounters:   05/15/17 117/74   05/08/17 112/64   05/01/17 96/59    Weight from Last 3 Encounters:   05/15/17 175 lb (79.4 kg)   05/08/17 176 lb 8 oz (80.1 kg)   05/01/17 174 lb 14.4 oz (79.3 kg)              Today, you had the following     No orders found for display       Primary Care Provider Office Phone # Fax #    Aida REED Birgit JENA [facility] [phone]       [facility] [address] [address]        Thank you!     Thank you for choosing [facility]  for your care. Our goal is always to provide you with excellent care. Hearing back from our patients is one way we can continue to improve our services. Please take a few minutes to complete the written survey that you may receive in the mail after your visit with us. Thank you!             Your Updated Medication List - Protect others around you: Learn how to safely use, store and throw away your medicines at www.disposemymeds.org.          This list is accurate as of: 5/15/17  1:32 PM.  Always use your most recent med list.                   Brand Name Dispense Instructions for use    EPINEPHrine 0.3 MG/0.3ML injection     0.6 mL    Inject 0.3 mLs (0.3 mg) into the muscle as needed for anaphylaxis       FAMCICLOVIR PO      Take 250 mg by mouth       miconazole 2 % cream    CVS MICONAZOLE 7    45 g    Place 1 applicator vaginally At Bedtime       order for DME     1 Device    Please dispense one hospital grade breast pump for rental from [facility]ation services       TRIAMCINOLONE ACETONIDE (TOP) 0.1 % Crea     60    apply to area three times per " day       * VALTREX 500 MG tablet   Generic drug:  valACYclovir     30    4 tablets in in AM and 4 tablets in PM for one day prn cold sore       * valACYclovir 500 MG tablet    VALTREX    20 tablet    Take 1 tablet (500 mg) by mouth 2 times daily       * valACYclovir 1000 mg tablet    VALTREX    30 tablet    Take 1 tablet (1,000 mg) by mouth 2 times daily       * Notice:  This list has 3 medication(s) that are the same as other medications prescribed for you. Read the directions carefully, and ask your doctor or other care provider to review them with you.

## 2017-05-22 ENCOUNTER — PRENATAL OFFICE VISIT (OUTPATIENT)
Dept: MIDWIFE SERVICES | Facility: CLINIC | Age: 37
End: 2017-05-22
Payer: COMMERCIAL

## 2017-05-22 VITALS
HEART RATE: 93 BPM | BODY MASS INDEX: 26.61 KG/M2 | WEIGHT: 175 LBS | SYSTOLIC BLOOD PRESSURE: 112 MMHG | DIASTOLIC BLOOD PRESSURE: 73 MMHG

## 2017-05-22 DIAGNOSIS — Z87.59 HISTORY OF LACTATION DISORDER: ICD-10-CM

## 2017-05-22 DIAGNOSIS — Z34.83 OTHER NORMAL PREGNANCY, NOT FIRST, THIRD TRIMESTER: Primary | ICD-10-CM

## 2017-05-22 PROCEDURE — 99207 ZZC PRENATAL VISIT: CPT | Performed by: ADVANCED PRACTICE MIDWIFE

## 2017-05-22 NOTE — PROGRESS NOTES
38w5d  Feeling well and ready to birth. Had a great prenatal yoga class yesterday and feels great. Reports good fetal movement. Denies leaking of fluid, vaginal bleeding, regular uterine contractions, headache or other concerns.  Discussed alergy to anesthetic and reports nausea after having her wisdom teeth out and after breast reduction. Did not have nausea r/t epidural with last delivery. RTC in 1 wk DESMOND

## 2017-05-22 NOTE — MR AVS SNAPSHOT
"              After Visit Summary   2017    Carmen Dorsey    MRN: [id number]           Patient Information     Date Of Birth          1980        Visit Information        Provider Department      2017 1:00 PM Gretchen Washington APRN CNM [facility]        Today's Diagnoses     Other normal pregnancy, not first, third trimester    -  1    History of lactation disorder           Follow-ups after your visit        Who to contact     If you have questions or need follow up information about today's clinic visit or your schedule please contact [facility] directly at [phone].  Normal or non-critical lab and imaging results will be communicated to you by [employer], letter or phone within 4 business days after the clinic has received the results. If you do not hear from us within 7 days, please contact the clinic through [employer] or phone. If you have a critical or abnormal lab result, we will notify you by phone as soon as possible.  Submit refill requests through [employer] or call your pharmacy and they will forward the refill request to us. Please allow 3 business days for your refill to be completed.          Additional Information About Your Visit        MyChart Information     [employer] lets you send messages to your doctor, view your test results, renew your prescriptions, schedule appointments and more. To sign up, go to [email or web address] . Click on \"Log in\" on the left side of the screen, which will take you to the Welcome page. Then click on \"Sign up Now\" on the right side of the page.     You will be asked to enter the access code listed below, as well as some personal information. Please follow the directions to create your username and password.     Your access code is: [email or web address]  Expires: 2017  1:27 PM     Your access code will  in 90 days. If you need help or a new code, please call your [facility] or [phone].        Care " EveryWhere ID     This is your Care EveryWhere ID. This could be used by other organizations to access your [place] medical records  [id number]        Your Vitals Were     Pulse Last Period BMI (Body Mass Index)             93 08/24/2016 26.61 kg/m2          Blood Pressure from Last 3 Encounters:   05/22/17 112/73   05/15/17 117/74   05/08/17 112/64    Weight from Last 3 Encounters:   05/22/17 175 lb (79.4 kg)   05/15/17 175 lb (79.4 kg)   05/08/17 176 lb 8 oz (80.1 kg)              Today, you had the following     No orders found for display       Primary Care Provider Office Phone # Fax #    Aida JENA [facility] [phone]       [facility] [address] [facility] [address]  [facility] [address]        Thank you!     Thank you for choosing [facility]  for your care. Our goal is always to provide you with excellent care. Hearing back from our patients is one way we can continue to improve our services. Please take a few minutes to complete the written survey that you may receive in the mail after your visit with us. Thank you!             Your Updated Medication List - Protect others around you: Learn how to safely use, store and throw away your medicines at www.disposemymeds.org.          This list is accurate as of: 5/22/17  1:27 PM.  Always use your most recent med list.                   Brand Name Dispense Instructions for use    EPINEPHrine 0.3 MG/0.3ML injection     0.6 mL    Inject 0.3 mLs (0.3 mg) into the muscle as needed for anaphylaxis       FAMCICLOVIR PO      Take 250 mg by mouth       miconazole 2 % cream    CVS MICONAZOLE 7    45 g    Place 1 applicator vaginally At Bedtime       order for DME     1 Device    Please dispense one hospital grade breast pump for rental from [facility] lactation services       TRIAMCINOLONE ACETONIDE (TOP) 0.1 % Crea     60    apply to area three times per day       * VALTREX 500 MG tablet   Generic drug:   valACYclovir     30    4 tablets in in AM and 4 tablets in PM for one day prn cold sore       * valACYclovir 500 MG tablet    VALTREX    20 tablet    Take 1 tablet (500 mg) by mouth 2 times daily       * valACYclovir 1000 mg tablet    VALTREX    30 tablet    Take 1 tablet (1,000 mg) by mouth 2 times daily       * Notice:  This list has 3 medication(s) that are the same as other medications prescribed for you. Read the directions carefully, and ask your doctor or other care provider to review them with you.

## 2017-05-31 ENCOUNTER — PRENATAL OFFICE VISIT (OUTPATIENT)
Dept: MIDWIFE SERVICES | Facility: CLINIC | Age: 37
End: 2017-05-31
Payer: COMMERCIAL

## 2017-05-31 VITALS
SYSTOLIC BLOOD PRESSURE: 116 MMHG | HEART RATE: 98 BPM | BODY MASS INDEX: 27.22 KG/M2 | DIASTOLIC BLOOD PRESSURE: 80 MMHG | WEIGHT: 179 LBS

## 2017-05-31 DIAGNOSIS — Z87.59 HISTORY OF LACTATION DISORDER: ICD-10-CM

## 2017-05-31 DIAGNOSIS — O48.0 POST-TERM PREGNANCY, 40-42 WEEKS OF GESTATION: Primary | ICD-10-CM

## 2017-05-31 DIAGNOSIS — Z23 NEED FOR TDAP VACCINATION: ICD-10-CM

## 2017-05-31 PROCEDURE — 99207 ZZC PRENATAL VISIT: CPT | Performed by: ADVANCED PRACTICE MIDWIFE

## 2017-05-31 NOTE — MR AVS SNAPSHOT
After Visit Summary   5/31/2017    Carmen Dorsey    MRN: [id number]           Patient Information     Date Of Birth          1980        Visit Information        Provider Department      5/31/2017 12:00 PM Desmond Sherwood APRN CNM [facility]        Today's Diagnoses     Post-term pregnancy, 40-42 weeks of gestation    -  1    History of lactation disorder        Need for Tdap vaccination           Follow-ups after your visit        Your next 10 appointments already scheduled     Jun 06, 2017  1:20 PM CDT   US OB SINGLE FOLLOW UP REPEAT with RDUS1   [facility]    [address] [place] [address] [address]  [facility] [address] [phone]           Please bring a list of your medicines (including vitamins, minerals and over-the-counter drugs). Also, tell your doctor about any allergies you may have. Wear comfortable clothes and leave your valuables at home.  If you re less than 20 weeks drink four 8-ounce glasses of fluid an hour before your exam. If you need to empty your bladder before your exam, try to release only a little urine. Then, drink another glass of fluid.  You may have up to two family members in the exam room. If you bring a small child, an adult must be there to care for him or her.  Please call the Imaging Department at your exam site with any questions.            Jun 06, 2017  2:00 PM CDT   ESTABLISHED PRENATAL with RD NON STRESS TEST RM   [facility]    [address] [place] [address] [address]  [facility] [id number] [phone]            Jun 06, 2017  2:30 PM CDT   ESTABLISHED PRENATAL with JENA Arias CNM   [facility]    [address] [place] [address] [address]  [facility] [address] [phone]              Future tests that were ordered for you today     Open Future Orders        Priority Expected Expires Ordered     "US OB Single Follow Up Repeat Routine  2018            Who to contact     If you have questions or need follow up information about today's clinic visit or your schedule please contact [facility] directly at [phone].  Normal or non-critical lab and imaging results will be communicated to you by MyChart, letter or phone within 4 business days after the clinic has received the results. If you do not hear from us within 7 days, please contact the clinic through MyChart or phone. If you have a critical or abnormal lab result, we will notify you by phone as soon as possible.  Submit refill requests through [employer] or call your pharmacy and they will forward the refill request to us. Please allow 3 business days for your refill to be completed.          Additional Information About Your Visit        [employer] Information     [employer] lets you send messages to your doctor, view your test results, renew your prescriptions, schedule appointments and more. To sign up, go to [email or web address] . Click on \"Log in\" on the left side of the screen, which will take you to the Welcome page. Then click on \"Sign up Now\" on the right side of the page.     You will be asked to enter the access code listed below, as well as some personal information. Please follow the directions to create your username and password.     Your access code is: [email or web address]  Expires: 2017  1:27 PM     Your access code will  in 90 days. If you need help or a new code, please call your [place] clinic or [phone].        Care EveryWhere ID     This is your Care EveryWhere ID. This could be used by other organizations to access your [place] medical records  [id number]        Your Vitals Were     Pulse Last Period BMI (Body Mass Index)             98 2016 27.22 kg/m2          Blood Pressure from Last 3 Encounters:   17 116/80   17 112/73   05/15/17 117/74    Weight from Last 3 Encounters: "   05/31/17 179 lb (81.2 kg)   05/22/17 175 lb (79.4 kg)   05/15/17 175 lb (79.4 kg)               Primary Care Provider Office Phone # Fax #    Aida NielsenlucianJENA JOCEYONG [phone]       [facility] [address] [place] [address]  [facility] [address]        Thank you!     Thank you for choosing [facility]  for your care. Our goal is always to provide you with excellent care. Hearing back from our patients is one way we can continue to improve our services. Please take a few minutes to complete the written survey that you may receive in the mail after your visit with us. Thank you!             Your Updated Medication List - Protect others around you: Learn how to safely use, store and throw away your medicines at www.disposemymeds.org.          This list is accurate as of: 5/31/17 11:59 PM.  Always use your most recent med list.                   Brand Name Dispense Instructions for use    EPINEPHrine 0.3 MG/0.3ML injection     0.6 mL    Inject 0.3 mLs (0.3 mg) into the muscle as needed for anaphylaxis       FAMCICLOVIR PO      Take 250 mg by mouth       miconazole 2 % cream    CVS MICONAZOLE 7    45 g    Place 1 applicator vaginally At Bedtime       order for DME     1 Device    Please dispense one hospital grade breast pump for rental from [facility] lactation services       TRIAMCINOLONE ACETONIDE (TOP) 0.1 % Crea     60    apply to area three times per day       * VALTREX 500 MG tablet   Generic drug:  valACYclovir     30    4 tablets in in AM and 4 tablets in PM for one day prn cold sore       * valACYclovir 500 MG tablet    VALTREX    20 tablet    Take 1 tablet (500 mg) by mouth 2 times daily       * valACYclovir 1000 mg tablet    VALTREX    30 tablet    Take 1 tablet (1,000 mg) by mouth 2 times daily       * Notice:  This list has 3 medication(s) that are the same as other medications prescribed for you. Read the directions carefully, and ask your doctor  or other care provider to review them with you.

## 2017-06-01 NOTE — PROGRESS NOTES
Here with son Naun.  Is having some ctx but not intense.  Good fetal movement.  Hx of macrosomia with 1st but this baby EFW today is 8# 2 oz.  Declines SVE.  Discussed post dates testing next week at 41 weeks.    ASSESSMENT: 40w1d  PLAN: RTC in 1 wk for US and NST.    HALI

## 2017-06-05 ENCOUNTER — HOSPITAL ENCOUNTER (INPATIENT)
Facility: CLINIC | Age: 37
LOS: 2 days | Discharge: HOME HEALTH | End: 2017-06-07
Attending: ADVANCED PRACTICE MIDWIFE | Admitting: ADVANCED PRACTICE MIDWIFE
Payer: COMMERCIAL

## 2017-06-05 PROBLEM — Z36.89 ENCOUNTER FOR TRIAGE IN PREGNANT PATIENT: Status: ACTIVE | Noted: 2017-06-05

## 2017-06-05 LAB
APTT PPP: 23 SEC (ref 22–37)
ERYTHROCYTE [DISTWIDTH] IN BLOOD BY AUTOMATED COUNT: 13.6 % (ref 10–15)
FIBRINOGEN PPP-MCNC: 370 MG/DL (ref 200–420)
HCT VFR BLD AUTO: 33.5 % (ref 35–47)
HGB BLD-MCNC: 11.6 G/DL (ref 11.7–15.7)
INR PPP: 0.95 (ref 0.86–1.14)
MCH RBC QN AUTO: 31.7 PG (ref 26.5–33)
MCHC RBC AUTO-ENTMCNC: 34.6 G/DL (ref 31.5–36.5)
MCV RBC AUTO: 92 FL (ref 78–100)
PLATELET # BLD AUTO: 182 10E9/L (ref 150–450)
RBC # BLD AUTO: 3.66 10E12/L (ref 3.8–5.2)
WBC # BLD AUTO: 15.7 10E9/L (ref 4–11)

## 2017-06-05 PROCEDURE — 85384 FIBRINOGEN ACTIVITY: CPT | Performed by: OBSTETRICS & GYNECOLOGY

## 2017-06-05 PROCEDURE — 86900 BLOOD TYPING SEROLOGIC ABO: CPT | Performed by: OBSTETRICS & GYNECOLOGY

## 2017-06-05 PROCEDURE — 99215 OFFICE O/P EST HI 40 MIN: CPT

## 2017-06-05 PROCEDURE — S0191 MISOPROSTOL, ORAL, 200 MCG: HCPCS | Performed by: OBSTETRICS & GYNECOLOGY

## 2017-06-05 PROCEDURE — 72200001 ZZH LABOR CARE VAGINAL DELIVERY SINGLE

## 2017-06-05 PROCEDURE — 85730 THROMBOPLASTIN TIME PARTIAL: CPT | Performed by: OBSTETRICS & GYNECOLOGY

## 2017-06-05 PROCEDURE — 86901 BLOOD TYPING SEROLOGIC RH(D): CPT | Performed by: OBSTETRICS & GYNECOLOGY

## 2017-06-05 PROCEDURE — 12000030 ZZH R&B OB INTERMEDIATE UMMC

## 2017-06-05 PROCEDURE — 0KQM0ZZ REPAIR PERINEUM MUSCLE, OPEN APPROACH: ICD-10-PCS | Performed by: OBSTETRICS & GYNECOLOGY

## 2017-06-05 PROCEDURE — 59400 OBSTETRICAL CARE: CPT | Performed by: OBSTETRICS & GYNECOLOGY

## 2017-06-05 PROCEDURE — 25000125 ZZHC RX 250: Performed by: OBSTETRICS & GYNECOLOGY

## 2017-06-05 PROCEDURE — 85610 PROTHROMBIN TIME: CPT | Performed by: OBSTETRICS & GYNECOLOGY

## 2017-06-05 PROCEDURE — 85027 COMPLETE CBC AUTOMATED: CPT | Performed by: OBSTETRICS & GYNECOLOGY

## 2017-06-05 PROCEDURE — 25000128 H RX IP 250 OP 636

## 2017-06-05 PROCEDURE — 86850 RBC ANTIBODY SCREEN: CPT | Performed by: OBSTETRICS & GYNECOLOGY

## 2017-06-05 PROCEDURE — 36415 COLL VENOUS BLD VENIPUNCTURE: CPT | Performed by: OBSTETRICS & GYNECOLOGY

## 2017-06-05 PROCEDURE — 25000125 ZZHC RX 250

## 2017-06-05 PROCEDURE — 25000132 ZZH RX MED GY IP 250 OP 250 PS 637: Performed by: OBSTETRICS & GYNECOLOGY

## 2017-06-05 RX ORDER — ACETAMINOPHEN 325 MG/1
650 TABLET ORAL EVERY 4 HOURS PRN
Status: DISCONTINUED | OUTPATIENT
Start: 2017-06-05 | End: 2017-06-07 | Stop reason: HOSPADM

## 2017-06-05 RX ORDER — LANOLIN 100 %
OINTMENT (GRAM) TOPICAL
Status: DISCONTINUED | OUTPATIENT
Start: 2017-06-05 | End: 2017-06-07 | Stop reason: HOSPADM

## 2017-06-05 RX ORDER — MISOPROSTOL 200 UG/1
TABLET ORAL
Status: DISCONTINUED
Start: 2017-06-05 | End: 2017-06-06 | Stop reason: HOSPADM

## 2017-06-05 RX ORDER — BISACODYL 10 MG
10 SUPPOSITORY, RECTAL RECTAL DAILY PRN
Status: DISCONTINUED | OUTPATIENT
Start: 2017-06-07 | End: 2017-06-07 | Stop reason: HOSPADM

## 2017-06-05 RX ORDER — OXYTOCIN 10 [USP'U]/ML
10 INJECTION, SOLUTION INTRAMUSCULAR; INTRAVENOUS
Status: DISCONTINUED | OUTPATIENT
Start: 2017-06-05 | End: 2017-06-07 | Stop reason: HOSPADM

## 2017-06-05 RX ORDER — METHYLERGONOVINE MALEATE 0.2 MG/1
200 TABLET ORAL EVERY 6 HOURS SCHEDULED
Status: DISCONTINUED | OUTPATIENT
Start: 2017-06-06 | End: 2017-06-07 | Stop reason: HOSPADM

## 2017-06-05 RX ORDER — OXYTOCIN/0.9 % SODIUM CHLORIDE 30/500 ML
340 PLASTIC BAG, INJECTION (ML) INTRAVENOUS CONTINUOUS
Status: DISCONTINUED | OUTPATIENT
Start: 2017-06-05 | End: 2017-06-06

## 2017-06-05 RX ORDER — HYDROCORTISONE 2.5 %
CREAM (GRAM) TOPICAL 3 TIMES DAILY PRN
Status: DISCONTINUED | OUTPATIENT
Start: 2017-06-05 | End: 2017-06-07 | Stop reason: HOSPADM

## 2017-06-05 RX ORDER — OXYTOCIN/0.9 % SODIUM CHLORIDE 30/500 ML
340 PLASTIC BAG, INJECTION (ML) INTRAVENOUS CONTINUOUS PRN
Status: DISCONTINUED | OUTPATIENT
Start: 2017-06-05 | End: 2017-06-07 | Stop reason: HOSPADM

## 2017-06-05 RX ORDER — MISOPROSTOL 200 UG/1
400 TABLET ORAL ONCE
Status: COMPLETED | OUTPATIENT
Start: 2017-06-05 | End: 2017-06-05

## 2017-06-05 RX ORDER — LIDOCAINE HYDROCHLORIDE 10 MG/ML
INJECTION, SOLUTION EPIDURAL; INFILTRATION; INTRACAUDAL; PERINEURAL
Status: COMPLETED
Start: 2017-06-05 | End: 2017-06-05

## 2017-06-05 RX ORDER — OXYCODONE HYDROCHLORIDE 5 MG/1
5-10 TABLET ORAL
Status: DISCONTINUED | OUTPATIENT
Start: 2017-06-05 | End: 2017-06-07 | Stop reason: HOSPADM

## 2017-06-05 RX ORDER — IBUPROFEN 400 MG/1
400-800 TABLET, FILM COATED ORAL EVERY 6 HOURS PRN
Status: DISCONTINUED | OUTPATIENT
Start: 2017-06-05 | End: 2017-06-07 | Stop reason: HOSPADM

## 2017-06-05 RX ORDER — OXYTOCIN 10 [USP'U]/ML
INJECTION, SOLUTION INTRAMUSCULAR; INTRAVENOUS
Status: COMPLETED
Start: 2017-06-05 | End: 2017-06-05

## 2017-06-05 RX ORDER — MISOPROSTOL 200 UG/1
400 TABLET ORAL
Status: DISCONTINUED | OUTPATIENT
Start: 2017-06-05 | End: 2017-06-07 | Stop reason: HOSPADM

## 2017-06-05 RX ORDER — NALOXONE HYDROCHLORIDE 0.4 MG/ML
.1-.4 INJECTION, SOLUTION INTRAMUSCULAR; INTRAVENOUS; SUBCUTANEOUS
Status: DISCONTINUED | OUTPATIENT
Start: 2017-06-05 | End: 2017-06-07 | Stop reason: HOSPADM

## 2017-06-05 RX ORDER — OXYTOCIN/0.9 % SODIUM CHLORIDE 30/500 ML
PLASTIC BAG, INJECTION (ML) INTRAVENOUS
Status: DISCONTINUED
Start: 2017-06-05 | End: 2017-06-05 | Stop reason: WASHOUT

## 2017-06-05 RX ORDER — AMOXICILLIN 250 MG
1-2 CAPSULE ORAL 2 TIMES DAILY
Status: DISCONTINUED | OUTPATIENT
Start: 2017-06-05 | End: 2017-06-07 | Stop reason: HOSPADM

## 2017-06-05 RX ADMIN — LIDOCAINE HYDROCHLORIDE: 10 INJECTION, SOLUTION EPIDURAL; INFILTRATION; INTRACAUDAL; PERINEURAL at 21:23

## 2017-06-05 RX ADMIN — IBUPROFEN 800 MG: 400 TABLET ORAL at 22:22

## 2017-06-05 RX ADMIN — OXYTOCIN-SODIUM CHLORIDE 0.9% IV SOLN 30 UNIT/500ML 100 ML/HR: 30-0.9/5 SOLUTION at 00:41

## 2017-06-05 RX ADMIN — OXYTOCIN 10 UNITS: 10 INJECTION INTRAVENOUS at 21:16

## 2017-06-05 RX ADMIN — OXYTOCIN-SODIUM CHLORIDE 0.9% IV SOLN 30 UNIT/500ML 340 ML/HR: 30-0.9/5 SOLUTION at 23:04

## 2017-06-05 RX ADMIN — MISOPROSTOL 400 MCG: 200 TABLET ORAL at 23:04

## 2017-06-05 NOTE — IP AVS SNAPSHOT
MRN:[id number]                      After Visit Summary   6/5/2017    Carmen Dorsey    MRN: [id number]           Thank you!     Thank you for choosing [facility] for your care. Our goal is always to provide you with excellent care. Hearing back from our patients is one way we can continue to improve our services. Please take a few minutes to complete the written survey that you may receive in the mail after you visit with us. Thank you!        Patient Information     Date Of Birth          1980        Designated Caregiver       Most Recent Value    Caregiver    Will someone help with your care after discharge? no      About your hospital stay     You were admitted on:  June 5, 2017 You last received care in the:  [facility]    You were discharged on:  June 7, 2017       Who to Call     For medical emergencies, please call 911.  For non-urgent questions about your medical care, please call your primary care provider or clinic, [phone]          Attending Provider     Provider Specialty    Desmond Sherwood APRN CNM [facility]       Primary Care Provider Office Phone # Fax #    JENA Young [phone]      Further instructions from your care team       Postpartum Vaginal Delivery Instructions    Activity       Ask family and friends for help when you need it.    Do not place anything in your vagina for 6 weeks.    You are not restricted on other activities, but take it easy for a few weeks to allow your body to recover from delivery.  You are able to do any activities you feel up to that point.    No driving until you have stopped taking your pain medications (usually two weeks after delivery).     Call your health care provider if you have any of these symptoms:       Increased pain, swelling, redness, or fluid around your stiches from an episiotomy or perineal tear.    A fever above 100.4 F (38 C) with or without chills when placing a thermometer under your  "tongue.    You soak a sanitary pad with blood within 1 hour, or you see blood clots larger than a golf ball.    Bleeding that lasts more than 6 weeks.    Vaginal discharge that smells bad.    Severe pain, cramping or tenderness in your lower belly area.    A need to urinate more frequently (use the toilet more often), more urgently (use the toilet very quickly), or it burns when you urinate.    Nausea and vomiting.    Redness, swelling or pain around a vein in your leg.    Problems breastfeeding or a red or painful area on your breast.    Chest pain and cough or are gasping for air.    Problems coping with sadness, anxiety, or depression.  If you have any concerns about hurting yourself or the baby, call your provider immediately.     You have questions or concerns after you return home.     Keep your hands clean:  Always wash your hands before touching your perineal area and stitches.  This helps reduce your risk of infection.  If your hands aren't dirty, you may use an alcohol hand-rub to clean your hands. Keep your nails clean and short.        Pending Results     No orders found from 6/3/2017 to 6/6/2017.            Statement of Approval     Ordered          06/07/17 0937  I have reviewed and agree with all the recommendations and orders detailed in this document.  EFFECTIVE NOW     Approved and electronically signed by:  Polina Sykes APRN CNM             Admission Information     Date & Time Provider Department Dept. Phone    6/5/2017 Desmond Sherwood APRN CNM [facility] [phone]      Your Vitals Were     Blood Pressure Pulse Temperature Respirations Last Period Pulse Oximetry    114/66 67 97.6  F (36.4  C) (Oral) 18 08/24/2016 100%      MyChart Information     [employer] lets you send messages to your doctor, view your test results, renew your prescriptions, schedule appointments and more. To sign up, go to [email or web address] . Click on \"Log in\" on the left side of the screen, which will take you to " "the Welcome page. Then click on \"Sign up Now\" on the right side of the page.     You will be asked to enter the access code listed below, as well as some personal information. Please follow the directions to create your username and password.     Your access code is: [email or web address]  Expires: 2017  1:27 PM     Your access code will  in 90 days. If you need help or a new code, please call your [place] clinic or [phone].        Care EveryWhere ID     This is your Care EveryWhere ID. This could be used by other organizations to access your [place] medical records  [id number]           Review of your medicines      START taking        Dose / Directions    * ibuprofen 400 MG tablet   Commonly known as:  ADVIL/MOTRIN        Dose:  400-800 mg   Take 1-2 tablets (400-800 mg) by mouth every 6 hours as needed for other (cramping)   Quantity:  30 tablet   Refills:  0       * ibuprofen 600 MG tablet   Commonly known as:  ADVIL/MOTRIN        Dose:  600 mg   Take 1 tablet (600 mg) by mouth every 6 hours as needed for moderate pain   Quantity:  90 tablet   Refills:  1       methylergonovine 0.2 MG tablet   Commonly known as:  METHERGINE   Used for:  Delayed postpartum hemorrhage        Dose:  0.2 mg   Take 1 tablet (200 mcg) by mouth every 6 hours   Quantity:  8 tablet   Refills:  0       senna-docusate 8.6-50 MG per tablet   Commonly known as:  SENOKOT-S;PERICOLACE        Dose:  1-2 tablet   Take 1-2 tablets by mouth 2 times daily as needed for constipation   Quantity:  30 tablet   Refills:  0       sennosides 8.6 MG tablet   Commonly known as:  SENOKOT        Dose:  1 tablet   Take 1 tablet by mouth 2 times daily   Quantity:  60 tablet   Refills:  1       * Notice:  This list has 2 medication(s) that are the same as other medications prescribed for you. Read the directions carefully, and ask your doctor or other care provider to review them with you.      CONTINUE these medicines which have NOT CHANGED        " Dose / Directions    EPINEPHrine 0.3 MG/0.3ML injection   Used for:  H/O bee sting allergy        Dose:  0.3 mg   Inject 0.3 mLs (0.3 mg) into the muscle as needed for anaphylaxis   Quantity:  0.6 mL   Refills:  1       order for DME   Used for:  At risk for ineffective breastfeeding        Please dispense one hospital grade breast pump for rental from [facility] lactation services   Quantity:  1 Device   Refills:  0       TRIAMCINOLONE ACETONIDE (TOP) 0.1 % Crea   Used for:  Contact dermatitis and other eczema, due to unspecified cause        apply to area three times per day   Quantity:  60   Refills:  1         STOP taking     FAMCICLOVIR PO           miconazole 2 % cream   Commonly known as:  CVS MICONAZOLE 7           valACYclovir 1000 mg tablet   Commonly known as:  VALTREX           valACYclovir 500 MG tablet   Commonly known as:  VALTREX           VALTREX 500 MG tablet   Generic drug:  valACYclovir                Where to get your medicines      These medications were sent to [facility] [address]  [address] [address]     Phone:  [phone]     ibuprofen 400 MG tablet    ibuprofen 600 MG tablet    methylergonovine 0.2 MG tablet    senna-docusate 8.6-50 MG per tablet    sennosides 8.6 MG tablet                Protect others around you: Learn how to safely use, store and throw away your medicines at www.disposemymeds.org.             Medication List: This is a list of all your medications and when to take them. Check marks below indicate your daily home schedule. Keep this list as a reference.      Medications           Morning Afternoon Evening Bedtime As Needed    EPINEPHrine 0.3 MG/0.3ML injection   Inject 0.3 mLs (0.3 mg) into the muscle as needed for anaphylaxis                                * ibuprofen 400 MG tablet   Commonly known as:  ADVIL/MOTRIN   Take 1-2 tablets (400-800 mg) by mouth every 6 hours as needed for  other (cramping)   Last time this was given:  800 mg on 6/7/2017 10:26 AM                                * ibuprofen 600 MG tablet   Commonly known as:  ADVIL/MOTRIN   Take 1 tablet (600 mg) by mouth every 6 hours as needed for moderate pain   Last time this was given:  800 mg on 6/7/2017 10:26 AM                                methylergonovine 0.2 MG tablet   Commonly known as:  METHERGINE   Take 1 tablet (200 mcg) by mouth every 6 hours   Last time this was given:  200 mcg on 6/7/2017 10:26 AM                                order for DME   Please dispense one hospital grade breast pump for rental from [facility] lactation services                                senna-docusate 8.6-50 MG per tablet   Commonly known as:  SENOKOT-S;PERICOLACE   Take 1-2 tablets by mouth 2 times daily as needed for constipation   Last time this was given:  2 tablets on 6/7/2017  8:50 AM                                sennosides 8.6 MG tablet   Commonly known as:  SENOKOT   Take 1 tablet by mouth 2 times daily                                TRIAMCINOLONE ACETONIDE (TOP) 0.1 % Crea   apply to area three times per day                                * Notice:  This list has 2 medication(s) that are the same as other medications prescribed for you. Read the directions carefully, and ask your doctor or other care provider to review them with you.

## 2017-06-05 NOTE — IP AVS SNAPSHOT
[facility]    [address] [facility] [address]    Phone:  [phone]                                       After Visit Summary   6/5/2017    Carmen Dorsey    MRN: [id number]           After Visit Summary Signature Page     I have received my discharge instructions, and my questions have been answered. I have discussed any challenges I see with this plan with the nurse or doctor.    ..........................................................................................................................................  Patient/Patient Representative Signature      ..........................................................................................................................................  Patient Representative Print Name and Relationship to Patient    ..................................................               ................................................  Date                                            Time    ..........................................................................................................................................  Reviewed by Signature/Title    ...................................................              ..............................................  Date                                                            Time

## 2017-06-06 LAB
ABO + RH BLD: NORMAL
ABO + RH BLD: NORMAL
BLD GP AB SCN SERPL QL: NORMAL
BLOOD BANK CMNT PATIENT-IMP: NORMAL
HGB BLD-MCNC: 10.5 G/DL (ref 11.7–15.7)
SPECIMEN EXP DATE BLD: NORMAL

## 2017-06-06 PROCEDURE — 25000125 ZZHC RX 250: Performed by: ADVANCED PRACTICE MIDWIFE

## 2017-06-06 PROCEDURE — 25000132 ZZH RX MED GY IP 250 OP 250 PS 637: Performed by: OBSTETRICS & GYNECOLOGY

## 2017-06-06 PROCEDURE — 36415 COLL VENOUS BLD VENIPUNCTURE: CPT | Performed by: OBSTETRICS & GYNECOLOGY

## 2017-06-06 PROCEDURE — 12000030 ZZH R&B OB INTERMEDIATE UMMC

## 2017-06-06 PROCEDURE — 25000132 ZZH RX MED GY IP 250 OP 250 PS 637: Performed by: ADVANCED PRACTICE MIDWIFE

## 2017-06-06 PROCEDURE — 85018 HEMOGLOBIN: CPT | Performed by: OBSTETRICS & GYNECOLOGY

## 2017-06-06 RX ORDER — IBUPROFEN 400 MG/1
400-800 TABLET, FILM COATED ORAL EVERY 6 HOURS PRN
Qty: 30 TABLET | Refills: 0 | Status: SHIPPED | OUTPATIENT
Start: 2017-06-06 | End: 2017-06-14

## 2017-06-06 RX ORDER — AMOXICILLIN 250 MG
1-2 CAPSULE ORAL 2 TIMES DAILY PRN
Qty: 30 TABLET | Refills: 0 | Status: SHIPPED | OUTPATIENT
Start: 2017-06-06 | End: 2017-07-20

## 2017-06-06 RX ORDER — OXYTOCIN/0.9 % SODIUM CHLORIDE 30/500 ML
100 PLASTIC BAG, INJECTION (ML) INTRAVENOUS CONTINUOUS
Status: DISCONTINUED | OUTPATIENT
Start: 2017-06-06 | End: 2017-06-06

## 2017-06-06 RX ADMIN — ACETAMINOPHEN 650 MG: 325 TABLET, FILM COATED ORAL at 06:40

## 2017-06-06 RX ADMIN — METHYLERGONOVINE MALEATE 200 MCG: 0.2 TABLET ORAL at 19:54

## 2017-06-06 RX ADMIN — METHYLERGONOVINE MALEATE 200 MCG: 0.2 TABLET ORAL at 02:06

## 2017-06-06 RX ADMIN — OXYTOCIN-SODIUM CHLORIDE 0.9% IV SOLN 30 UNIT/500ML 100 ML/HR: 30-0.9/5 SOLUTION at 00:43

## 2017-06-06 RX ADMIN — ACETAMINOPHEN 650 MG: 325 TABLET, FILM COATED ORAL at 23:44

## 2017-06-06 RX ADMIN — IBUPROFEN 800 MG: 400 TABLET ORAL at 19:54

## 2017-06-06 RX ADMIN — ACETAMINOPHEN 650 MG: 325 TABLET, FILM COATED ORAL at 12:47

## 2017-06-06 RX ADMIN — OXYTOCIN-SODIUM CHLORIDE 0.9% IV SOLN 30 UNIT/500ML 100 ML/HR: 30-0.9/5 SOLUTION at 05:46

## 2017-06-06 RX ADMIN — IBUPROFEN 800 MG: 400 TABLET ORAL at 05:46

## 2017-06-06 RX ADMIN — METHYLERGONOVINE MALEATE 200 MCG: 0.2 TABLET ORAL at 08:00

## 2017-06-06 RX ADMIN — SENNOSIDES AND DOCUSATE SODIUM 1 TABLET: 8.6; 5 TABLET ORAL at 19:54

## 2017-06-06 RX ADMIN — METHYLERGONOVINE MALEATE 200 MCG: 0.2 TABLET ORAL at 13:39

## 2017-06-06 RX ADMIN — SENNOSIDES AND DOCUSATE SODIUM 1 TABLET: 8.6; 5 TABLET ORAL at 08:00

## 2017-06-06 RX ADMIN — ACETAMINOPHEN 650 MG: 325 TABLET, FILM COATED ORAL at 02:06

## 2017-06-06 NOTE — PLAN OF CARE
Problem: Goal Outcome Summary  Goal: Goal Outcome Summary  Outcome: Improving  Pt transferred to [facility] via wheelchair. Bedside report was completed. Room orientation provided to pt. Questions encouraged/answered. PP assessment WNL. Vital signs stable. Boucher remains in place due to PPH, output appropriate. U/1 firm, small flow, no clots. Pitocin currently infusing at 100ml/hr-see MAR. Encouraged oral intake. Pain managed with Ibuprofen, tylenol and hot packs for uterine cramping-see MAR. Breastfeeding infant on cue with minimal assist, latch assessed and is appropriate. Pt voiced concerns will milk production and poor BF experiencing with last child due to hx of breast augmentation. Request LC consult in AM. Educated pt on importance on hand massage prior to feeding and expressing colostrum after infant feedings.  remains in room, positive support system. Will continue with plan of care.

## 2017-06-06 NOTE — PLAN OF CARE
Problem: Postpartum, Vaginal Delivery (Adult)  Goal: Signs and Symptoms of Listed Potential Problems Will be Absent or Manageable (Postpartum, Vaginal Delivery)  Signs and symptoms of listed potential problems will be absent or manageable by discharge/transition of care (reference Postpartum, Vaginal Delivery (Adult) CPG).   Outcome: Therapy, progress toward functional goals is gradual  Up to bathroom to urinate for the first time and passed a blood clot 80ml. No active heavy vaginal bleeding. Fundus at U2 and firm. Voided good amount and returned to bed. Text page sent to CARMITA Helms CNM.

## 2017-06-06 NOTE — PROGRESS NOTES
Patient arrived to unit at 2108 with spouse, Siva, and , Ally. She was leaking clear fluid, they stated her water broke 10 minutes earlier. She had a strong urge to push. Dr. Poon was at bedside. PT put into hands and knees. This RN tried obtaining fetal heart tones, heard heart tones in 120s for a few seconds but unable to keep monitor there during SVE, and then pt started pushing. Baby girl delivered at 2114. Postpartum recovery normal thus far. Will give Ibuprofen for lower back pain. Fundus firm at U/1, lochia rubra small and no clots.

## 2017-06-06 NOTE — H&P
"[facility], [place]    History and Physical  Obstetrics and Gynecology     Date of Admission:  2017   Date of Service (when I saw the patient): 17    Assessment and Plan.  Carmen Dorsey is a 37 year old  at 40w5d who presented in active labor and had a precipitous delivery.      ASSESSMENT:   Carmen Dorsey is a 37 year old  at 40w5d who presents in active labor with ruptured membranes.   GBS negative  Rh positive  Pregnancy complicated by advanced maternal age    PLAN:   Admit to Birthplace  Precipitous     Ally Poon MD      History of Present Illness  Carmen Dorsey is a 37 year old  at 40w5d  Estimated Date of Delivery: 17 by Patient's LMP from OB Dating Form was 2016. consistent with 14 week ultrasound. Contractions started at 2000, membranes ruptured around 2030, clear fluid. Very intense contractions ensued. Arrived to Birthplace yelling \"ring of fire\" with strong urge to push. Active fetal movement.     Prenatal Course  Prenatal course was   complicated by    Patient Active Problem List    Diagnosis Date Noted     Encounter for triage in pregnant patient 2017     Priority: Medium     Indication for care in labor and delivery, antepartum 2017     Priority: Medium      (spontaneous vaginal delivery) 2017     Priority: Medium     Elderly multigravida in second trimester 10/28/2016     Priority: Medium     ANGELINE 17 MFM U/S; 13 6 /7 weeks, ANGELINE; 17.    she might have to change the clinic- depending on change of her insurance 2017  Hx of 10 lbs 9 oz baby in .   Family hx of diabetes, consider 3 hour GTT and forgo 1 hour.         Recurrent cold sores 2016     Priority: Medium     Valtrex helps taking 1-2 days.         H/O bee sting allergy 2016     Priority: Medium     Lipoma of skin and subcutaneous tissue 2016     Priority: Medium      " On left scapular region, by medial border- possibly a small lipoma less than an inch- watchful waiting- if increase in size need biopsy and excison         History of lactation disorder 05/22/2017     Had bilat breast reduction. Needs industrial pump to establish supply.        Need for Tdap vaccination 04/10/2017     4/10/17 given Tdap           Recent Labs   Lab Test  11/21/14   1044   ABO  B   RH   Pos   AS  Neg     Rhogam not indicated   Recent Labs   Lab Test  06/04/15   0904  11/21/14   1043   HEPBANG   --   Nonreactive   HIAGAB   --   Nonreactive   HIV-1 p24 Ag & HIV-1/HIV-2 Ab Not Detected     GBS  Negative  No GBS DNA detected, presumed negative for GBS or number of bacteria may be   below the limit of detection of the assay.   Assay performed on incubated broth culture of specimen using [employer] real-time   PCR.     --    RUQIGG   --   26       Past Medical History   I have reviewed this patient's medical history and updated it with pertinent information if needed.   Past Medical History:   Diagnosis Date     NO ACTIVE PROBLEMS        Past Surgical History  I have reviewed this patient's surgical history and updated it with pertinent information if needed.  Past Surgical History:   Procedure Laterality Date     HC REDUCTION OF LARGE BREAST  2002    [facility]       Prior to Admission Medications    No current facility-administered medications on file prior to encounter.   Current Outpatient Prescriptions on File Prior to Encounter:  valACYclovir (VALTREX) 1000 mg tablet Take 1 tablet (1,000 mg) by mouth 2 times daily   order for DME Please dispense one hospital grade breast pump for rental from [facility] lactation services   miconazole ([employer] MICONAZOLE 7) 2 % cream Place 1 applicator vaginally At Bedtime   valACYclovir (VALTREX) 500 MG tablet Take 1 tablet (500 mg) by mouth 2 times daily   EPINEPHrine (EPIPEN) 0.3 MG/0.3ML injection Inject 0.3 mLs (0.3 mg) into the muscle as  needed for anaphylaxis   FAMCICLOVIR PO Take 250 mg by mouth   VALTREX 500 MG OR TABS 4 tablets in in AM and 4 tablets in PM for one day prn cold sore   TRIAMCINOLONE ACETONIDE (TOP) 0.1 % EX CREA apply to area three times per day       Allergies  No Known Allergies    Social History  I have reviewed this patient's social history and updated it with pertinent information if needed. Carmen Dorsey  reports that she has never smoked. She does not have any smokeless tobacco history on file. She reports that she drinks alcohol. She reports that she does not use illicit drugs.    Family History  Family history reviewed with patient and is noncontributory.    Immunization History  Immunization History   Administered Date(s) Administered     Hepatitis A Vac Ped/Adol-2 Dose 04/08/1998, 07/31/2002     Hepatitis B 04/08/1998, 06/09/1998, 05/10/1999     MMR 06/02/1981, 08/17/1993     Mantoux 06/09/1998     Rabavert 07/01/2002     TD (ADULT, 7+) 08/01/1986, 08/21/1997, 03/19/1998     TDAP Vaccine (Adacel) 07/20/2007     TDAP Vaccine (Boostrix) 04/10/2017     Tetanus 03/19/1998     Typhoid IM 07/01/2002     Yellow Fever 07/01/2002       Physical Exam  Vitals:    06/05/17 2125   BP: 128/68   Pulse: 98   Resp: 16   Temp: 97.7  F (36.5  C)   TempSrc: Oral     Constitutional: healthy, alert, active and very uncomfortable   Abdomen: gravid, single vertex fetus, non-tender, EFW 8 lbs 8  :  Cervix:   Membranes: clear amniotic fluid s/p SROM   Dilation: 10   Effacement: 100%   Station: fetal head at perineum       Fetal Heart Tones: not obtained          Labs  No results found for this or any previous visit (from the past 24 hour(s)).

## 2017-06-06 NOTE — L&D DELIVERY NOTE
Vaginal Delivery Summary    Carmen Dorsey  [id number]    2017 - 40w5d            Stage 1:  Carmen Dorsey is a 37 year old  at 40w5d who presented in active labor with SROM. GBS neg. Contractions started at 2000 with SROM shortly after, clear fluid. Arrived to the Birthplace with strong urge to push. Patient had received care with [facility] group but [facility] was in a different delivery so I was called to evaluate and manage patient. As soon as patient was in a room, found to be complete with fetal head at perineum. No fetal heart tones obtained prior to delivery due to precipitous delivery.     Stage 2:  Pushed for 2 minutes to deliver viable female infant on 2017 at 2114 from OA position, mother on hands and knees.  Anterior shoulder delivered with ease followed by posterior shoulder. Vigorous with spontaneous cry.   Put on mother's chest where delayed cord clamping was done. Apgars 9 at 1 min, 9 at 5 min. Weight 9lb 10oz (4366g).     Stage 3:  Placenta delivered spontaneously with controlled traction on the cord, intact, 3 vessels. Pitocin IM given with good tone.  EBL 300mL/QBL 381mL.    Second degree laceration infiltrated with 1% lidocaine. External anal sphincter capsule intact but exposed, reapproximated with figure of eight 3-0 vicryl. Digital rectal exam performed to confirm intact sphincter. Second degree laceration repaired with 3-0 vicryl in standard fashion. Mother and infant in stable condition. No complications.    Ally Poon MD    Delivery Summary    Carmen Dorsey MRN# [id number]   Age: 37 year old YOB: 1980           Labor Event Times    Labor onset date:  17 Onset time:   8:00 PM   Dilation complete date:  17 Complete time:   9:12 PM   Start pushing date/time:  2017            Labor Length    1st Stage (hrs):  1 (min):  12   2nd Stage (hrs):  0 (min):  2   3rd Stage (hrs):  0 (min):  7      Labor  "Events     labor?:  No   Labor Type:  Spontaneous      Antibiotics received during labor?:  No      Rupture identifier:  Rupture 1   Rupture date/time: 17   Rupture type:  Spontaneous rupture of membranes occuring during spontaneous labor or augmentation   Fluid color:  Clear   Fluid odor:  Normal      1:1 continuous labor support provided by?:            Delivery/Placenta Date and Time    Delivery Date:  17 Delivery Time:   9:14 PM   Placenta Date/Time:  2017  9:21 PM   Oxytocin given at the time of delivery:  after delivery of baby      Vaginal Counts    Initial count performed by 2 team members:   Two Team Members   KARLENE Aleman MD          Needles Suture [place] [employer]   Initial counts 2  5    Added to count  1     Final counts 2 1 5       Placed during labor Accounted for at the end of labor   No    No    No       Final count performed by 2 team members:   Two Team Members   KARLENE Aleman MD         Final count correct?:  Yes         Apgars    Living status:  Living    1 Minute 5 Minute 10 Minute 15 Minute 20 Minute   Skin color: 1  1       Heart rate: 2  2       Reflex irritability: 2  2       Muscle tone: 2  2       Respiratory effort: 2  2       Total: 9  9          Apgars assigned by:  NIR KRUEGER RN      Cord    Vessels:  3 Vessels Complications:  None   Cord Blood Disposition:  Lab Gases Sent?:  No          Resuscitation    Methods:  None       Care at Delivery:  Infant born vigorous with spontaneous cry.  Brought to maternal abdomen for delayed cord clamping and remains skin to skin.  Infant dried and stimulated at maternal abdomen.   Output in Delivery Room:  Stool      [place] Measurements    Weight:  154 oz Length:  21\"   Head circumference:  0.356 m       Skin to Skin and Feeding Plan    Skin to skin initiation date/time: 17   Skin to skin with:  Mother   Skin to skin end date/time:     How do you plan to feed " your baby:  Breastfeeding      Labor Events and Shoulder Dystocia    Fetal Tracing Comments:  no fetal tracing prior to delivery   Shoulder dystocia present?:  Neg            Delivery (Maternal) (Provider to Complete) ([id number])    Episiotomy:  None   Perineal lacerations:  2nd    Est. blood loss (mL):  300         Mother's Information  Mother: Carmen Dorsey #[id number]    Start of Mother's Information     IO Blood Loss  06/05/17 2000 - 06/06/17 0617    Mom's I/O Activity            End of Mother's Information  Mother: Carmen Dorsey #[id number]            Delivery - Provider to Complete ([id number])    Delivering clinician:  ALLY JUDGE   Attempted Delivery Types (Choose all that apply):  Spontaneous Vaginal Delivery   Delivery Type (Choose the 1 that will go to the Birth History):  Vaginal, Spontaneous Delivery                           Placenta    Delayed Cord Clamping:  Done   Date/Time:  6/5/2017  9:21 PM   Removal:  Spontaneous   Comments:  normal, intact, 3vc   Disposition:  Patient possesion      Anesthesia    Method:  None         Presentation and Position    Presentation:  Vertex    Occiput Anterior                    Ally Judge MD

## 2017-06-06 NOTE — PLAN OF CARE
Problem: Goal Outcome Summary  Goal: Goal Outcome Summary  Outcome: Improving  Stable postpartum. F/firm at U2 flow small. Doing well resting. Lactation assistance provided.  Baby skin to skin with mother and family bonding.

## 2017-06-06 NOTE — PROGRESS NOTES
Pt's fundus firm at U/1 after passing baseball sized clot, notified Dr. Poon. Agree with Dr. Poon's  note re: interventions for postpartum hemorrhage. This RN provided fundal massage while IV access obtained and miso given. Fundus was mainly firm, intermittently boggy. Total blood loss this far: 1172 mL.  provided support to patient with cool cloths on forehead, warm blankets. Pt coping during interventions.  at bedside holding baby. Bedside report given to ZENAIDA Bryan RN.

## 2017-06-06 NOTE — PROVIDER NOTIFICATION
06/06/17 0044   Provider Notification   Provider Name/Title Desmond Sherwood CNM   Method of Notification In Department   Notification Reason Status Update   Discussed with Desmond that patient's blood output has been stable and patient was feeling a lot better. Desmond stated patient could be admitted to [facility] at any point since her condition was stable. Desmond put in order for IV pitocin to be run at 100 mL/hr overnight.

## 2017-06-06 NOTE — PLAN OF CARE
Data: Carmen Dorsey transferred to [place] [id number] via wheelchair at 0130. Baby transferred via parent's arms.  Action: Receiving unit notified of transfer: Patient and family notified of room change. Report given to KARLENE Rodriguez at 0140. RN informed of post partum hemorrhage (current QBL 1295 prior to transfer) and to continue with IV pitocin running at 100 mL/hr overnight along with oral methergine Q 6 hours. Boucher catheter in place during transfer and to continue in place for 12 hours per orders.  Belongings sent to receiving unit. Accompanied by Registered Nurse. Oriented patient to surroundings. Call light within reach. ID bands double-checked with receiving RN.  Response: Patient tolerated transfer and is stable.

## 2017-06-06 NOTE — PROGRESS NOTES
Per RN patient passed baseball sized clot. Fundus firm at U. QBL now 681mL. Received IM pitocin. Will have patient empty her bladder, give sublingual misoprostol, start IV, give IV pitocin and check CBC, T&S.   Ally Poon MD    ADDENDUM  Uterus boggy but firmed up with massage. IV inserted, pitocin infusing. Sublingual misoprostol given. Boucher catheter placed, >200mL urine out. Fundus firm 2cm below umbilicus with small trickle. coags added on to lab draw. VSS. HR 80s, BP 110s-140s/70s. Total QBL 1170mL. Will continue to monitor closely and give PO methergine q6h. Desmond Sherwood aware.     Ally Poon MD

## 2017-06-06 NOTE — PROGRESS NOTES
Bleeding is now stable after intervention.  IV Pitocin and plan for methergine po post partum due to delayed PP hemorrhage 1 hr post delivery.      CBC RESULTS:   Recent Labs   Lab Test  06/05/17   2321   WBC  15.7*   RBC  3.66*   HGB  11.6*   HCT  33.5*   MCV  92   MCH  31.7   MCHC  34.6   RDW  13.6   PLT  182     Coags are normal.    Repair is intact.    Boucher to drainage.  Pitocin infusion.    ASSESSMENT: PPH > 1100.  PLAN: Monitor VS and Hgb in am.          Desmond Sherwood APRN, CNM

## 2017-06-06 NOTE — PLAN OF CARE
Problem: Goal Outcome Summary  Goal: Goal Outcome Summary  Outcome: Improving  Saw patient and  during a feeding after baby Louisa had already latched on.  Position and latch looked good, and patient stated she was comfortable.  Discussed history of breast reduction and low milk supply for first child, the memory of which is still upsetting to her.  Discussed breast massage and hand expression, which she said she is already doing.  Said baby is latching on for long periods of time, and seems content afterwards.  Introduced the recommendation that she also pump after each feeding starting today if possible.   stated he wants to protect her time to rest, but understands advisability of pumping.  She states she is very tired.  Has pump in room, so pump attachments and supplies given, and stated she could call at any time for assistance, but that we would also respect her need for rest.  She has extensive experience with pumping from first child, and has a hospital grade pump already at home.

## 2017-06-06 NOTE — PROGRESS NOTES
Post Partum Note  SIGNIFICANT PROBLEMS:  Patient Active Problem List    Diagnosis Date Noted     Encounter for triage in pregnant patient 2017     Priority: Medium     Indication for care in labor and delivery, antepartum 2017     Priority: Medium      (spontaneous vaginal delivery) 2017     Priority: Medium     History of lactation disorder 2017     Priority: Medium     Had bilat breast reduction. Needs industrial pump to establish supply.        Need for Tdap vaccination 04/10/2017     Priority: Medium     4/10/17 given Tdap       Elderly multigravida in second trimester 10/28/2016     Priority: Medium     ANGELINE 17 MFM U/S; 13 6 /7 weeks, ANGELINE; 17.    she might have to change the clinic- depending on change of her insurance 2017  Hx of 10 lbs 9 oz baby in .   Family hx of diabetes, consider 3 hour GTT and forgo 1 hour.         Recurrent cold sores 2016     Priority: Medium     Valtrex helps taking 1-2 days.         H/O bee sting allergy 2016     Priority: Medium     Lipoma of skin and subcutaneous tissue 2016     Priority: Medium      On left scapular region, by medial border- possibly a small lipoma less than an inch- watchful waiting- if increase in size need biopsy and excison           INTERVAL HISTORY:  /70  Pulse 64  Temp 98  F (36.7  C) (Oral)  Resp 16  LMP 2016  SpO2 100%  Breastfeeding? Unknown  Pt stable, baby is rooming in  Breast feeding status:initiated  Complications since 2 hours post delivery:  Postpartum Hemorrhage - bleeding has been stable  Patient is tolerating acitivity well Voiding without difficulty, cramping is relieved by Ibuprophen, lochia is decreasing and patient denies clots.  Perineal pain is is relieved by Ibuprophen.  The perineum laceration is well approximated    Postpartum hemoglobin   Hemoglobin   Date Value Ref Range Status   2017 10.5 (L) 11.7 - 15.7 g/dL Final     Blood type   Lab  Results   Component Value Date    ABO B 2017       Lab Results   Component Value Date    RH  Pos 2017     Rubella status No results found for: RUBELLAABIGG    ASSESSMENT/PLAN:  Normal postpartum exam   Stable Post-partum day #1  Complications:none  Plan d/c home tomorrow  RTC 6 weeks  Teaching done: D/C Instructions: Nutrition/Activity, Engorgement Management, Birth Control Options, Warning Signs/When to Call: Excessive Bleeding, Infection, PP Depression, Kegals and Crunches, RTC Clinic for PP Appointment and PNV  Birthcontrol planned:Undecided. Fertility and contraception options reviewed. - Joking about a vasectomy, I reviewed long term options  Current Discharge Medication List      START taking these medications    Details   ibuprofen (ADVIL/MOTRIN) 400 MG tablet Take 1-2 tablets (400-800 mg) by mouth every 6 hours as needed for other (cramping)  Qty: 30 tablet, Refills: 0    Associated Diagnoses:  (spontaneous vaginal delivery)      senna-docusate (SENOKOT-S;PERICOLACE) 8.6-50 MG per tablet Take 1-2 tablets by mouth 2 times daily as needed for constipation  Qty: 30 tablet, Refills: 0    Associated Diagnoses:  (spontaneous vaginal delivery)         CONTINUE these medications which have NOT CHANGED    Details   order for DME Please dispense one hospital grade breast pump for rental from [facility] lactation services  Qty: 1 Device, Refills: 0    Associated Diagnoses: At risk for ineffective breastfeeding      EPINEPHrine (EPIPEN) 0.3 MG/0.3ML injection Inject 0.3 mLs (0.3 mg) into the muscle as needed for anaphylaxis  Qty: 0.6 mL, Refills: 1    Associated Diagnoses: H/O bee sting allergy      TRIAMCINOLONE ACETONIDE (TOP) 0.1 % EX CREA apply to area three times per day  Qty: 60, Refills: 1    Associated Diagnoses: Contact dermatitis and other eczema, due to unspecified cause         STOP taking these medications       valACYclovir (VALTREX) 1000 mg tablet Comments:   Reason  for Stopping:         miconazole ([employer]S MICONAZOLE 7) 2 % cream Comments:   Reason for Stopping:         valACYclovir (VALTREX) 500 MG tablet Comments:   Reason for Stopping:         FAMCICLOVIR PO Comments:   Reason for Stopping:         VALTREX 500 MG OR TABS Comments:   Reason for Stopping:             JEFFERSON Amor CNM

## 2017-06-06 NOTE — PROVIDER NOTIFICATION
Text page sent to CARMITA Helms regarding 80ml blood clot passed when up to bathroom for the first time.

## 2017-06-07 VITALS
OXYGEN SATURATION: 100 % | TEMPERATURE: 97.6 F | DIASTOLIC BLOOD PRESSURE: 66 MMHG | SYSTOLIC BLOOD PRESSURE: 114 MMHG | RESPIRATION RATE: 18 BRPM | HEART RATE: 67 BPM

## 2017-06-07 PROCEDURE — 25000132 ZZH RX MED GY IP 250 OP 250 PS 637: Performed by: OBSTETRICS & GYNECOLOGY

## 2017-06-07 PROCEDURE — 25000132 ZZH RX MED GY IP 250 OP 250 PS 637: Performed by: ADVANCED PRACTICE MIDWIFE

## 2017-06-07 RX ORDER — SENNOSIDES 8.6 MG
1 TABLET ORAL 2 TIMES DAILY
Qty: 60 TABLET | Refills: 1 | Status: SHIPPED | OUTPATIENT
Start: 2017-06-07 | End: 2017-07-20

## 2017-06-07 RX ORDER — METHYLERGONOVINE MALEATE 0.2 MG/1
0.2 TABLET ORAL EVERY 6 HOURS
Qty: 8 TABLET | Refills: 0 | Status: SHIPPED | OUTPATIENT
Start: 2017-06-07 | End: 2017-06-14

## 2017-06-07 RX ORDER — IBUPROFEN 600 MG/1
600 TABLET, FILM COATED ORAL EVERY 6 HOURS PRN
Qty: 90 TABLET | Refills: 1 | Status: SHIPPED | OUTPATIENT
Start: 2017-06-07 | End: 2017-06-14

## 2017-06-07 RX ADMIN — METHYLERGONOVINE MALEATE 200 MCG: 0.2 TABLET ORAL at 04:30

## 2017-06-07 RX ADMIN — IBUPROFEN 800 MG: 400 TABLET ORAL at 10:26

## 2017-06-07 RX ADMIN — ACETAMINOPHEN 650 MG: 325 TABLET, FILM COATED ORAL at 12:23

## 2017-06-07 RX ADMIN — ACETAMINOPHEN 650 MG: 325 TABLET, FILM COATED ORAL at 06:03

## 2017-06-07 RX ADMIN — SENNOSIDES AND DOCUSATE SODIUM 2 TABLET: 8.6; 5 TABLET ORAL at 08:50

## 2017-06-07 RX ADMIN — METHYLERGONOVINE MALEATE 200 MCG: 0.2 TABLET ORAL at 10:26

## 2017-06-07 RX ADMIN — IBUPROFEN 800 MG: 400 TABLET ORAL at 04:30

## 2017-06-07 NOTE — PLAN OF CARE
Problem: Goal Outcome Summary  Goal: Goal Outcome Summary  Outcome: Improving  PP assessment WNL. Vital signs stable. Pt up ad chani, steady gait. Intake and output remains appropriate. U/2 firm, scant flow, no clots. Pain managed with Ibuprofen and tylenol for uterine cramping-see MAR. Breastfeeding infant on cue, latch assessed and is appropriate. Encouraged pt to complete edinburgh and watch [facility] videos prior to d/c.  remains in room, positive support system. Will continue with plan of care.

## 2017-06-07 NOTE — PLAN OF CARE
Problem: Goal Outcome Summary  Goal: Goal Outcome Summary  Outcome: Adequate for Discharge Date Met:  06/07/17  Data: Vital signs stable and postpartum assessments within normal limits. Pt is up voiding, soak/shower this morning with no problem.  Pt is independent with breastfeeding and is feeding baby on demand. The left nipple is tender, but tolerable with feedings.  Action: checked latch and pt flange baby's lower lip. Review of care plan, teaching, and discharge instructions done with pt. Infant identification with ID bands done, pt verification with signature obtained. Pt was given copies of the discharge papers and discharge medications.  Response: pt states understanding and comfort with infant cares and feeding. All questions about baby care addressed. Pt discharged home with baby in a car seat.

## 2017-06-07 NOTE — PLAN OF CARE
Problem: Goal Outcome Summary  Goal: Goal Outcome Summary  Outcome: No Change  VSS and assessments WDL. Pain well managed with oral meds. Up ad chani, voiding freely. Breastfeeding independently, latch checked. Education given on hand expression and deeper latch.  by bedside, supportive. No concerns at this time, continue with current POC.

## 2017-06-07 NOTE — DISCHARGE SUMMARY
Post Partum Note    Carmen Dorsey      MRN#: [id number]  Age: 37 year old      YOB: 1980      Post-partum day #2    SIGNIFICANT PROBLEMS:  Patient Active Problem List    Diagnosis Date Noted     Encounter for triage in pregnant patient 2017     Priority: Medium     Indication for care in labor and delivery, antepartum 2017     Priority: Medium      (spontaneous vaginal delivery) 2017     Priority: Medium     History of lactation disorder 2017     Priority: Medium     Had bilat breast reduction. Needs industrial pump to establish supply.        Need for Tdap vaccination 04/10/2017     Priority: Medium     4/10/17 given Tdap       Elderly multigravida in second trimester 10/28/2016     Priority: Medium     ANGELINE 17 MFM U/S; 13 6 /7 weeks, ANGELINE; 17.    she might have to change the clinic- depending on change of her insurance 2017  Hx of 10 lbs 9 oz baby in .   Family hx of diabetes, consider 3 hour GTT and forgo 1 hour.         Recurrent cold sores 2016     Priority: Medium     Valtrex helps taking 1-2 days.         H/O bee sting allergy 2016     Priority: Medium     Lipoma of skin and subcutaneous tissue 2016     Priority: Medium      On left scapular region, by medial border- possibly a small lipoma less than an inch- watchful waiting- if increase in size need biopsy and excison           INTERVAL HISTORY:  /66  Pulse 67  Temp 97.6  F (36.4  C) (Oral)  Resp 18  LMP 2016  SpO2 100%  Breastfeeding? Unknown    Pt stable, baby is rooming in  Breast feeding status:initiated  Complications since 2 hours post delivery: None  Patient is tolerating acitivity well, voiding without difficulty, cramping is minimal and is relieved by Ibuprophen, lochia is decreasing and patient denies clots.  Perineal pain is is minimal and is relieved by Ibuprophen.  The perineum is intact    Normal postpartum exam     Postpartum  hemoglobin   Hemoglobin   Date Value Ref Range Status   2017 10.5 (L) 11.7 - 15.7 g/dL Final     Blood type   Lab Results   Component Value Date    ABO B 2017       Lab Results   Component Value Date    RH  Pos 2017     Rubella immune    ASSESSMENT/PLAN:  Stable Post-partum day #2  Complications:PPH with 1200ml EBL - hgb WNL. Will continue methergine for two days once discharged.   Plan d/c home today  RTC 6 weeks  Teaching done: D/C Instructions: Nutrition/Activity, Birth Control Options, Warning Signs/When to Call: Excessive Bleeding, Infection, PP Depression, Kegals and Crunches, RTC Clinic for PP Appointment and PNV    Postpartum warning s/s reviewed, including bleeding/clots, fever, mastitis, or depression    Birthcontrol planned:Condoms, considering vasectomy  Current Discharge Medication List      START taking these medications    Details   !! ibuprofen (ADVIL/MOTRIN) 600 MG tablet Take 1 tablet (600 mg) by mouth every 6 hours as needed for moderate pain  Qty: 90 tablet, Refills: 1    Associated Diagnoses:  (spontaneous vaginal delivery)      methylergonovine (METHERGINE) 0.2 MG tablet Take 1 tablet (200 mcg) by mouth every 6 hours  Qty: 8 tablet, Refills: 0    Associated Diagnoses: Delayed postpartum hemorrhage      sennosides (SENOKOT) 8.6 MG tablet Take 1 tablet by mouth 2 times daily  Qty: 60 tablet, Refills: 1    Associated Diagnoses:  (spontaneous vaginal delivery)      !! ibuprofen (ADVIL/MOTRIN) 400 MG tablet Take 1-2 tablets (400-800 mg) by mouth every 6 hours as needed for other (cramping)  Qty: 30 tablet, Refills: 0    Associated Diagnoses:  (spontaneous vaginal delivery)      senna-docusate (SENOKOT-S;PERICOLACE) 8.6-50 MG per tablet Take 1-2 tablets by mouth 2 times daily as needed for constipation  Qty: 30 tablet, Refills: 0    Associated Diagnoses:  (spontaneous vaginal delivery)       !! - Potential duplicate medications found. Please discuss with provider.       CONTINUE these medications which have NOT CHANGED    Details   order for DME Please dispense one hospital grade breast pump for rental from [facility] lactation services  Qty: 1 Device, Refills: 0    Associated Diagnoses: At risk for ineffective breastfeeding      EPINEPHrine (EPIPEN) 0.3 MG/0.3ML injection Inject 0.3 mLs (0.3 mg) into the muscle as needed for anaphylaxis  Qty: 0.6 mL, Refills: 1    Associated Diagnoses: H/O bee sting allergy      TRIAMCINOLONE ACETONIDE (TOP) 0.1 % EX CREA apply to area three times per day  Qty: 60, Refills: 1    Associated Diagnoses: Contact dermatitis and other eczema, due to unspecified cause         STOP taking these medications       valACYclovir (VALTREX) 1000 mg tablet Comments:   Reason for Stopping:         miconazole ([employer] MICONAZOLE 7) 2 % cream Comments:   Reason for Stopping:         valACYclovir (VALTREX) 500 MG tablet Comments:   Reason for Stopping:         FAMCICLOVIR PO Comments:   Reason for Stopping:         VALTREX 500 MG OR TABS Comments:   Reason for Stopping:           Polina Sykes CNM

## 2017-06-14 ENCOUNTER — OFFICE VISIT (OUTPATIENT)
Dept: FAMILY MEDICINE | Facility: CLINIC | Age: 37
End: 2017-06-14
Payer: COMMERCIAL

## 2017-06-14 DIAGNOSIS — O92.79 LACTATION SYMPTOM: Primary | ICD-10-CM

## 2017-06-14 LAB — PROLACTIN SERPL-MCNC: 98 UG/L (ref 3–27)

## 2017-06-14 PROCEDURE — 84146 ASSAY OF PROLACTIN: CPT | Performed by: NURSE PRACTITIONER

## 2017-06-14 PROCEDURE — 93000 ELECTROCARDIOGRAM COMPLETE: CPT | Performed by: NURSE PRACTITIONER

## 2017-06-14 PROCEDURE — 36415 COLL VENOUS BLD VENIPUNCTURE: CPT | Performed by: NURSE PRACTITIONER

## 2017-06-14 PROCEDURE — 99215 OFFICE O/P EST HI 40 MIN: CPT | Performed by: NURSE PRACTITIONER

## 2017-06-14 PROCEDURE — 84443 ASSAY THYROID STIM HORMONE: CPT | Performed by: NURSE PRACTITIONER

## 2017-06-14 RX ORDER — FAMCICLOVIR 500 MG/1
1000 TABLET ORAL 2 TIMES DAILY
Qty: 4 TABLET | Refills: 3 | COMMUNITY
Start: 2017-06-14 | End: 2019-05-01

## 2017-06-14 NOTE — NURSING NOTE
"Chief Complaint   Patient presents with     Lactation Consult       Initial LMP 08/24/2016 Estimated body mass index is 27.22 kg/(m^2) as calculated from the following:    Height as of 5/15/17: 5' 8\" (1.727 m).    Weight as of 5/31/17: 179 lb (81.2 kg).  Medication Reconciliation: complete     Edmar Nam MA      "

## 2017-06-14 NOTE — PATIENT INSTRUCTIONS
"I will have the triage nurses contact the pharmacies that we discussed regarding domperidone prescribing  If they accept a prescription from me, we'd go ahead and start 10 mg three times a day.    We'll do the EKG today assuming we are starting domperidone and then have a follow-up two weeks after starting  Great work with the nursing and pumping.  You are so dedicated to breastfeeding your baby!    Paced feedings - look at videos of what this looks like  \"bottle feeding the  baby\"    Continue with the 1 oz supplements and possibly moving up to 1.5 oz or 2 oz within the next week    Naun should have a MMR shot  "

## 2017-06-14 NOTE — MR AVS SNAPSHOT
"              After Visit Summary   6/14/2017    Carmen Dorsey    MRN: [id number]           Patient Information     Date Of Birth          1980        Visit Information        Provider Department      6/14/2017 11:15 AM Margi Solis APRN CNP [facility]        Today's Diagnoses     Lactation symptom    -  1      Care Instructions    I will have the triage nurses contact the pharmacies that we discussed regarding domperidone prescribing  If they accept a prescription from me, we'd go ahead and start 10 mg three times a day.    We'll do the EKG today assuming we are starting domperidone and then have a follow-up two weeks after starting  Great work with the nursing and pumping.  You are so dedicated to breastfeeding your baby!    Paced feedings - look at videos of what this looks like  \"bottle feeding the  baby\"    Continue with the 1 oz supplements and possibly moving up to 1.5 oz or 2 oz within the next week    Naun should have a MMR shot          Follow-ups after your visit        Who to contact     If you have questions or need follow up information about today's clinic visit or your schedule please contact [facility] directly at [phone].  Normal or non-critical lab and imaging results will be communicated to you by [employer], letter or phone within 4 business days after the clinic has received the results. If you do not hear from us within 7 days, please contact the clinic through [employer] or phone. If you have a critical or abnormal lab result, we will notify you by phone as soon as possible.  Submit refill requests through S[employer] or call your pharmacy and they will forward the refill request to us. Please allow 3 business days for your refill to be completed.          Additional Information About Your Visit        [employer] Information     S[employer]oot lets you send messages to your doctor, view your test results, renew your prescriptions, schedule " "appointments and more. To sign up, go to [email or web address] . Click on \"Log in\" on the left side of the screen, which will take you to the Welcome page. Then click on \"Sign up Now\" on the right side of the page.     You will be asked to enter the access code listed below, as well as some personal information. Please follow the directions to create your username and password.     Your access code is: [email or web address]  Expires: 2017  1:27 PM     Your access code will  in 90 days. If you need help or a new code, please call your [facility] clinic or [phone].        Care EveryWhere ID     This is your [facility] EveryWhere ID. This could be used by other organizations to access your [facility] medical records  [id number]        Your Vitals Were     Last Period                   2016            Blood Pressure from Last 3 Encounters:   17 114/66   17 116/80   17 112/73    Weight from Last 3 Encounters:   17 179 lb (81.2 kg)   17 175 lb (79.4 kg)   05/15/17 175 lb (79.4 kg)              We Performed the Following     EKG 12-lead complete w/read - Clinics     Prolactin     TSH with free T4 reflex          Today's Medication Changes          These changes are accurate as of: 17 12:42 PM.  If you have any questions, ask your nurse or doctor.               Stop taking these medicines if you haven't already. Please contact your care team if you have questions.     ibuprofen 400 MG tablet   Commonly known as:  ADVIL/MOTRIN   Stopped by:  Margi Solis APRN CNP           ibuprofen 600 MG tablet   Commonly known as:  ADVIL/MOTRIN   Stopped by:  Margi Solis APRN CNP           methylergonovine 0.2 MG tablet   Commonly known as:  METHERGINE   Stopped by:  Margi Solis APRN CNP                    Primary Care Provider Office Phone # Fax #    JENA Young [phone]       [facility] [address] [address]      "   Thank you!     Thank you for choosing [facility]  for your care. Our goal is always to provide you with excellent care. Hearing back from our patients is one way we can continue to improve our services. Please take a few minutes to complete the written survey that you may receive in the mail after your visit with us. Thank you!             Your Updated Medication List - Protect others around you: Learn how to safely use, store and throw away your medicines at www.disposemymeds.org.          This list is accurate as of: 6/14/17 12:42 PM.  Always use your most recent med list.                   Brand Name Dispense Instructions for use    EPINEPHrine 0.3 MG/0.3ML injection     0.6 mL    Inject 0.3 mLs (0.3 mg) into the muscle as needed for anaphylaxis       famciclovir 500 MG tablet    FAMVIR    4 tablet    Take 2 tablets (1,000 mg) by mouth 2 times daily       order for DME     1 Device    Please dispense one hospital grade breast pump for rental from [facility] lactation services       senna-docusate 8.6-50 MG per tablet    SENOKOT-S;PERICOLACE    30 tablet    Take 1-2 tablets by mouth 2 times daily as needed for constipation       sennosides 8.6 MG tablet    SENOKOT    60 tablet    Take 1 tablet by mouth 2 times daily       TRIAMCINOLONE ACETONIDE (TOP) 0.1 % Crea     60    apply to area three times per day

## 2017-06-15 LAB — TSH SERPL DL<=0.005 MIU/L-ACNC: 0.96 MU/L (ref 0.4–4)

## 2017-06-16 ENCOUNTER — TELEPHONE (OUTPATIENT)
Dept: FAMILY MEDICINE | Facility: CLINIC | Age: 37
End: 2017-06-16

## 2017-06-16 NOTE — TELEPHONE ENCOUNTER
Writer called patient left non detailed message requesting return call to clinic and ask to speak with nurse    Alexx Barreto RN

## 2017-06-16 NOTE — TELEPHONE ENCOUNTER
Please let patient know about normal thyroid and prolactin, as well as normal EKG.  Also let know about current pharmacy status - we can write to one of the [place] pharmacies, but it will take two weeks to get it and they do not take a credit card.  Not sure if we have any additional information on other pharmacies, but if so please share that.  I can write the prescription today if she wants to get the ball rolling or she can wait for info about other pharmacies.  If we do pick the one pharmacy, she will want to contact them to figure out payment.  CLAUDIA Hu

## 2017-06-19 ENCOUNTER — TELEPHONE (OUTPATIENT)
Dept: MIDWIFE SERVICES | Facility: CLINIC | Age: 37
End: 2017-06-19

## 2017-06-19 NOTE — TELEPHONE ENCOUNTER
Route to Elena    Spoke with pt, she wishes for the script to be written and then mail it to her home    Alex Velasco RN

## 2017-06-21 NOTE — TELEPHONE ENCOUNTER
Please add MARKELL and patient's address, then photocopy for abstraction.  Please let patient know that unfortunately we are now allowed to mail the prescriptions.  I'm sorry for the inconvenience.  CLAUDIA Hu

## 2017-06-28 NOTE — TELEPHONE ENCOUNTER
Huddled with Elena. Received pharmacy info per below. Ok to do nurse only pre and post weight.    Pharmacy is [facility] [phone] shipping $19.99 [place] ($13.00 [place])    [facility]   [address]   [place] [address]    During summer can take 2 weeks depending on the weather. Shipped same day from  and could take 2-4 weeks.  carries medication.    Paper script can be faxed to [phone] include pt's phone # Direct fax if trouble faxing to toll free is [phone].    Mother was given this information and baby scheduled for 6/30.    Yarely Redding RN  [facility]

## 2017-06-28 NOTE — TELEPHONE ENCOUNTER
Spoke with pt. States she picked up the script on 6/23 and hasn't had a chance to turn it into the pharmacy. She needs to know the name of the pharmacy. Got the rx in a sealed envelope and hasn't opened it yet. She just needs the name and pharmacy information you wrote it for.   Pumping is going pretty well. Pumping a couple of times per day, so not as much as she would have wanted. She is wondering about a pre and post feeding weight appt. Does she needs to see you for this, or could this just a nurse only appt? Time for feeding is different every day so around noon would be a good time.    Is visiting her parents right now. Continuiing supplemental nursing system. Introduced a bottle (because of travel) with a premie sized nipple and is doing the paced feeding like you recommended. Is past her birth weight and is gaining.     Yarely Redding RN  [facility]

## 2017-06-28 NOTE — PATIENT INSTRUCTIONS
Can we see if patient was able to get the domperidone prescription and turn it in?  I also wanted to give her an update that the midwife I had known about with milk didn't have access anymore.  How are things going with pumping?  ZENAIDA Solis, CLAUDIA

## 2017-06-29 NOTE — PROGRESS NOTES
Initial Lactation Consultation    Baby:  Baby1 Carmen Dorsey         MRN:  [id number]  Mom:      Consultation Date: 6/14/2017    HPI  Breastfeeding long-term goals: maximum as much as mother can. Not producing enough milk.  Felt engorged  For 2-3 days  Lost 10% weight, was not peeing and pooping, home nurse recommended supplementation  Some discomfort, but not as much with son  Breastfeeding story:  Breastfeed before. Second baby.     Nursing alot times per day/every 2-3 hours.  10 times in 24 hours Nursing on both side(s).  Nursing sessions last 20-30 minutes per side.    Nipple pain: a little tenderness     PUMPING: Other: Hospital pump rent at [facility]   # times per day:  4-5 times; max combined 20 ml sometimes after nursing    SUPPLEMENTATION: Donor milk and Formula (earth's best organic and Holy formula)  1 oz after each feeding    Baby's OUTPUT:   2-3 stooled diapers with mustard yellowish appearance    MOTHER      Breastfeeding History  Yes,  unsuccessful, Length of Time: over a year with  Low milk    Medical History  Breast reduction    Pregnancy History  Second birth  Very fast labor - arrived 15 minutes before baby's arrival  Postpartum hemorrhage    Delivery History  Vaginal    Labor Meds/Anesthesia  None    Current Medications  none    Herbals:  Mother's milk tea and Other [place] Super mix.   Took Moringa and Goat's Rue prenatally    ASSESSMENT OF MOTHER    Physical:   Breast appearance  Breast Size: large  Nipple Appearance - Left: intact  Nipple Appearance - Right: intact  Nipple Erectility - Left: erect with stimulation  Nipple Erectility - Right: erect with stimulation  Areolas Compressibility: soft  Nipple Size: average  Milk Supply: mature      BABY       Name: Danae Jimenez     Doctor: [facility]     BABY'S WEIGHT HISTORY  Last interval weight: 0.2 oz in 8 days  Birth Weight: 9 lb 10 oz  Discharge Weight: 9 lb 0.3 oz   Wt Readings from Last 5 Encounters:   06/23/17 9 lb 14.5  "oz (4.493 kg) (89 %)*   06/14/17 9 lb 0.5 oz (4.097 kg) (87 %)*   06/06/17 9 lb 0.3 oz (4.09 kg) (95 %)*     * Growth percentiles are based on WHO (Girls, 0-2 years) data.     Percentage wt. change from birth:       Since discharge from hospital, baby has a gain of 0 oz.in 8 days.  Note: Normal weight gain is 1/2 to 1 oz/day in the first 6 months of life.    ASSESSMENT OF BABY    Physical:   Temp 97.8  F (36.6  C) (Axillary)  Ht 1' 9\" (0.533 m)  Wt 9 lb 0.5 oz (4.097 kg)  HC 14\" (35.6 cm)  BMI 14.4 kg/m2    GENERAL: Alert, vigorous, is in no acute distress.  SKIN: skin is clear, no rash or abnormal pigmentation  HEAD: The head is normocephalic. The fontanels and sutures are normal  EYES: The eyes are normal. The conjunctivae and cornea normal.   NOSE: Clear, no discharge or congestion  MOUTH: The mouth is clear.  NECK: The neck is supple and thyroid is normal, no masses  LYMPH NODES: No adenopathy  LUNGS: The lung fields are clear to auscultation,no rales, rhonchi, wheezing or retractions  HEART: The precordium is quiet. Rhythm is regular. S1 and S2 are normal. No murmurs.   ABDOMEN: The umbilicus is normal. The bowel sounds are normal. Abdomen soft, non tender,  non distended, no masses or hepatosplenomegaly.  NEUROLOGIC: Normal tone throughout.     Oral Anatomy  Mouth: normal  Palate: normal  Jaw: normal  Tongue: normal  Lingual Frenulum: normal  Lip Frenulum: normal  Digital Suck Exam: root       INTERVENTIONS/EVALUATION:  Cross Cradle and Asymmetric Latch      SUMMARY  Low milk supply secondary to breast reduction surgery.  Previous low supply  Interested in domperidone - will start process to get ordered from [facility].  EKG was normal.  Had a good response with domperidone previously    RECOMMENDATIONS  Patient Instructions   I will have the triage nurses contact the pharmacies that we discussed regarding domperidone prescribing  If they accept a prescription from me, we'd go ahead and start 10 mg three times " "a day.    We'll do the EKG today assuming we are starting domperidone and then have a follow-up two weeks after starting  Great work with the nursing and pumping.  You are so dedicated to breastfeeding your baby!    Paced feedings - look at videos of what this looks like  \"bottle feeding the  baby\"    Continue with the 1 oz supplements and possibly moving up to 1.5 oz or 2 oz within the next week    Naun should have a MMR shot        Follow up: 2 weeks after starting domeperidone    60 minutes time spent face-to-face, 45 with mother and 15 with baby, with over 50% spent in counseling/coordination of care regarding breastfeeding goals, latch, nipple care, weight gain expectations, and pumping.     CLAUDIA Hu      "

## 2017-07-13 ENCOUNTER — MYC MEDICAL ADVICE (OUTPATIENT)
Dept: FAMILY MEDICINE | Facility: CLINIC | Age: 37
End: 2017-07-13

## 2017-07-13 NOTE — TELEPHONE ENCOUNTER
Elena,     Please see BATS message below.       Thank you,   Rocio Slater RN  [facility]

## 2017-07-19 ENCOUNTER — MYC MEDICAL ADVICE (OUTPATIENT)
Dept: FAMILY MEDICINE | Facility: CLINIC | Age: 37
End: 2017-07-19

## 2017-07-19 PROBLEM — Z23 NEED FOR TDAP VACCINATION: Status: RESOLVED | Noted: 2017-04-10 | Resolved: 2017-07-19

## 2017-07-19 PROBLEM — Z87.59 HISTORY OF LACTATION DISORDER: Status: RESOLVED | Noted: 2017-05-22 | Resolved: 2017-07-19

## 2017-07-20 ENCOUNTER — PRENATAL OFFICE VISIT (OUTPATIENT)
Dept: MIDWIFE SERVICES | Facility: CLINIC | Age: 37
End: 2017-07-20
Payer: COMMERCIAL

## 2017-07-20 VITALS
DIASTOLIC BLOOD PRESSURE: 74 MMHG | HEIGHT: 68 IN | BODY MASS INDEX: 23.95 KG/M2 | TEMPERATURE: 97.3 F | WEIGHT: 158 LBS | SYSTOLIC BLOOD PRESSURE: 114 MMHG

## 2017-07-20 DIAGNOSIS — D23.5 BENIGN NEOPLASM OF SKIN OF TRUNK, EXCEPT SCROTUM: ICD-10-CM

## 2017-07-20 PROCEDURE — 99207 ZZC POST PARTUM EXAM: CPT | Performed by: ADVANCED PRACTICE MIDWIFE

## 2017-07-20 NOTE — PROGRESS NOTES
"SUBJECTIVE:   Carmen Dorsey is here for her 6-week postpartum checkup.     HPI: Patient is doing well and looks well. Here with son Naun and daughter Rajwinder. She has no questions or concerns today. She has no bleeding or discomfort. She feels like her laceration healed well. She is breastfeeding which overall is going well. She had a breast reductions so she is not producing enough milk to only breastfeeding but supplementing is going well and producing more than she did with Naun so very happy about that. She is planning to use condoms for birth control and is pushing her  to get a vasectomy. She feels like she is adjusting well and has already been camping with her  and children.  Requesting referral for dermatology for small mass that has developed on her back. Referral given.      DELIVERY DATE: 2017   of a viable girl, weight 9 pounds 10 oz., with no complications.  FEEDING METHOD: and Bottlefed    CONTRACEPTION PLANNED: vasectomy and condoms  She  has had intercourse since delivery and complains of No discomfort.  HX OF DEPRESSION:No  HX OF ABUSE:No  OTHER HPI: She has no signs of infection, bleeding or other complications. Bleeding stopped, epis/lac healing well.         EXAM:  /74  Temp 97.3  F (36.3  C) (Oral)  Ht 5' 8\" (1.727 m)  Wt 158 lb (71.7 kg)  Breastfeeding? Yes  BMI 24.02 kg/m2  GENERAL APPEARANCE: healthy, alert and no distress  NECK: thyroid normal to palpation  BREAST: soft, nontender, nipples intact, lactating   ABDOMEN: soft, nontender, diastasis recti closing  PSYCH: mentation appears normal and affect normal/bright  PELVIC EXAM: deferred    ASSESSMENT:   Normal postpartum exam after .    PLAN:  Birth Control as ordered. Fertility reviewed.    Return as needed or at time interval for next routine pap, pelvic, or breast exam.  Encourage Kegals and abdominal exercise. Slow, steady weight loss.  Continue a multi vitamin supplement, " especially if breastfeeding.  Pap smear was not obtained. Due in 1 year, patient made aware.   GC/CHLAMYDIA CULTURE OBTAINED: no  Post partum Hgb was not obtained. 10.5 after delivery during admission.   Follow up in 1 year for annual exam and pap smear.     Chantell Dey CNM

## 2017-07-20 NOTE — MR AVS SNAPSHOT
After Visit Summary   7/20/2017    Carmen Dorsey    MRN: [id number]           Patient Information     Date Of Birth          1980        Visit Information        Provider Department      7/20/2017 10:15 AM Chantell Dey APRN [facility]        Today's Diagnoses     Routine postpartum follow-up    -  1    Benign neoplasm of skin of trunk, except scrotum           Follow-ups after your visit        Additional Services     DERMATOLOGY REFERRAL       Your provider has referred you to: [facility]: Dermatology [facility] [phone]   [email or web address]  FHN: [facility] [phone]  [email or web address]  N: [facility] [phone]   [email or web address]  [place] [facility] [phone]   [email or web address]  to [facility] [phone]   [email or web address]    Please be aware that coverage of these services is subject to the terms and limitations of your health insurance plan.  Call member services at your health plan with any benefit or coverage questions.      Please bring the following with you to your appointment:    (1) Any X-Rays, CTs or MRIs which have been performed.  Contact the facility where they were done to arrange for  prior to your scheduled appointment.    (2) List of current medications  (3) This referral request   (4) Any documents/labs given to you for this referral                  Who to contact     If you have questions or need follow up information about today's clinic visit or your schedule please contact [facility] directly at [phone].  Normal or non-critical lab and imaging results will be communicated to you by MyChart, letter or phone within 4 business days after the clinic has received the results. If you do not  "hear from us within 7 days, please contact the clinic through [employer] or phone. If you have a critical or abnormal lab result, we will notify you by phone as soon as possible.  Submit refill requests through [employer] or call your pharmacy and they will forward the refill request to us. Please allow 3 business days for your refill to be completed.          Additional Information About Your Visit        Grid[employer] Information     [employer] gives you secure access to your electronic health record. If you see a primary care provider, you can also send messages to your care team and make appointments. If you have questions, please call your primary care clinic.  If you do not have a primary care provider, please call [phone] and they will assist you.        Care EveryWhere ID     This is your Care EveryWhere ID. This could be used by other organizations to access your [facility] medical records  [id number]        Your Vitals Were     Temperature Height Breastfeeding? BMI (Body Mass Index)          97.3  F (36.3  C) (Oral) 5' 8\" (1.727 m) Yes 24.02 kg/m2         Blood Pressure from Last 3 Encounters:   07/20/17 114/74   06/07/17 114/66   05/31/17 116/80    Weight from Last 3 Encounters:   07/20/17 158 lb (71.7 kg)   05/31/17 179 lb (81.2 kg)   05/22/17 175 lb (79.4 kg)              We Performed the Following     DERMATOLOGY REFERRAL        Primary Care Provider Office Phone # Fax #    Aida DEREK Genao, APRN [facility] [phone]       [facility] [address] [facility] [address]  [facility] [address]        Equal Access to Services     [facility]: Hadii aad ku hadasho Soomaali, waaxda luqadaha, qaybta kaalmada adeegyada, leti monge . So [facility] [phone].    ATENCIÓN: Si habla español, tiene a hermosillo disposición servicios gratuitos de asistencia lingüística. Llame al [phone].    We comply with applicable federal civil rights laws and [place] laws. We do not discriminate on the " basis of race, color, national origin, age, disability sex, sexual orientation or gender identity.            Thank you!     Thank you for choosing [facility]  for your care. Our goal is always to provide you with excellent care. Hearing back from our patients is one way we can continue to improve our services. Please take a few minutes to complete the written survey that you may receive in the mail after your visit with us. Thank you!             Your Updated Medication List - Protect others around you: Learn how to safely use, store and throw away your medicines at www.disposemymeds.org.          This list is accurate as of: 7/20/17 11:01 AM.  Always use your most recent med list.                   Brand Name Dispense Instructions for use Diagnosis    DOMPERIDONE 10 MG TAB/CAP      Take 10 mg by mouth 3 times daily        EPINEPHrine 0.3 MG/0.3ML injection 2-pack    EPIPEN/ADRENACLICK/or ANY BX GENERIC EQUIV    0.6 mL    Inject 0.3 mLs (0.3 mg) into the muscle as needed for anaphylaxis    H/O bee sting allergy       famciclovir 500 MG tablet    FAMVIR    4 tablet    Take 2 tablets (1,000 mg) by mouth 2 times daily        order for DME     1 Device    Please dispense one hospital grade breast pump for rental from [facility] lactation services    At risk for ineffective breastfeeding       TRIAMCINOLONE ACETONIDE (TOP) 0.1 % Crea     60    apply to area three times per day    Contact dermatitis and other eczema, due to unspecified cause

## 2017-07-21 ASSESSMENT — PATIENT HEALTH QUESTIONNAIRE - PHQ9: SUM OF ALL RESPONSES TO PHQ QUESTIONS 1-9: 1

## 2019-05-01 ENCOUNTER — OFFICE VISIT (OUTPATIENT)
Dept: FAMILY MEDICINE | Facility: CLINIC | Age: 39
End: 2019-05-01
Payer: COMMERCIAL

## 2019-05-01 VITALS
SYSTOLIC BLOOD PRESSURE: 110 MMHG | BODY MASS INDEX: 21.98 KG/M2 | OXYGEN SATURATION: 99 % | HEIGHT: 68 IN | TEMPERATURE: 97.9 F | DIASTOLIC BLOOD PRESSURE: 71 MMHG | WEIGHT: 145 LBS | HEART RATE: 76 BPM

## 2019-05-01 DIAGNOSIS — K21.9 GASTROESOPHAGEAL REFLUX DISEASE WITHOUT ESOPHAGITIS: ICD-10-CM

## 2019-05-01 DIAGNOSIS — R11.0 NAUSEA: ICD-10-CM

## 2019-05-01 DIAGNOSIS — B00.1 RECURRENT COLD SORES: ICD-10-CM

## 2019-05-01 DIAGNOSIS — Z00.00 ROUTINE GENERAL MEDICAL EXAMINATION AT A HEALTH CARE FACILITY: Primary | ICD-10-CM

## 2019-05-01 DIAGNOSIS — R53.83 OTHER FATIGUE: ICD-10-CM

## 2019-05-01 PROCEDURE — 99395 PREV VISIT EST AGE 18-39: CPT | Performed by: FAMILY MEDICINE

## 2019-05-01 RX ORDER — FAMCICLOVIR 500 MG/1
1000 TABLET ORAL 2 TIMES DAILY
Qty: 40 TABLET | Refills: 0 | Status: SHIPPED | OUTPATIENT
Start: 2019-05-01 | End: 2020-10-26

## 2019-05-01 ASSESSMENT — MIFFLIN-ST. JEOR: SCORE: 1381.22

## 2019-05-01 NOTE — NURSING NOTE
"Chief Complaint   Patient presents with     Physical     discuss some nausea and lethargy, numbness in limbs-for about one day-for about one week,started to resolve itself on tues     /71   Pulse 76   Temp 97.9  F (36.6  C) (Oral)   Ht 1.727 m (5' 8\")   Wt 65.8 kg (145 lb)   LMP 04/23/2019   SpO2 99%   BMI 22.05 kg/m   Estimated body mass index is 22.05 kg/m  as calculated from the following:    Height as of this encounter: 1.727 m (5' 8\").    Weight as of this encounter: 65.8 kg (145 lb).        Health Maintenance due pending provider review:  NONE    n/a    Karla Floyd CMA  "

## 2019-05-01 NOTE — PROGRESS NOTES
SUBJECTIVE:   CC: Carmen Dorsye is an 39 year old woman who presents for preventive health visit.     She wants to discuss nausea,lethargy numbness  Symptoms of abdomen pain, she moves her hand over the periumbilical and lower abdomen area, and feels Gastroenterology related, had nausea , few episodes of voimiting, and stools are soft  All sounds like pregnancy but she is confident she is not pregnant     symptoms  started gradually   More pronounced a week ago, wondering if related to normal mensuration, or food allergy, as symptoms almost resolved,  Does not feel the need for urine pregnancy test or any blood test.  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 Multivitamin, plus one serving per day    Amount of exercise or daily activities, outside of work: active at home, yoga 3 x week    Problems taking medications regularly not applicable    Medication side effects: No    Have you had an eye exam in the past two years? no    Do you see a dentist twice per year? yes    Do you have sleep apnea, excessive snoring or daytime drowsiness?no      PROBLEMS TO ADD ON...    Today's PHQ-2 Score:   PHQ-2 ( 1999 Pfizer) 5/1/2019 10/28/2016   Q1: Little interest or pleasure in doing things 0 0   Q2: Feeling down, depressed or hopeless 0 0   PHQ-2 Score 0 0       Abuse: Current or Past(Physical, Sexual or Emotional)- NO  Do you feel safe in your environment? YES    Social History     Tobacco Use     Smoking status: Never Smoker      "Smokeless tobacco: Never Used   Substance Use Topics     Alcohol use: Yes     Comment: occas     If you drink alcohol do you typically have >3 drinks per day or >7 drinks per week? No                     Reviewed orders with patient.  Reviewed health maintenance and updated orders accordingly - Yes  BP Readings from Last 3 Encounters:   05/01/19 110/71   07/20/17 114/74   06/07/17 114/66    Wt Readings from Last 3 Encounters:   05/01/19 65.8 kg (145 lb)   07/20/17 71.7 kg (158 lb)   05/31/17 81.2 kg (179 lb)                    Mammogram not appropriate for this patient based on age.    Pertinent mammograms are reviewed under the imaging tab.  History of abnormal Pap smear: NO - age 30-65 PAP every 5 years with negative HPV co-testing recommended  PAP / HPV Latest Ref Rng & Units 7/7/2015 7/20/2007 7/6/2006   PAP - NIL NIL NIL   HPV 16 DNA NEG Negative - -   HPV 18 DNA NEG Negative - -   OTHER HR HPV NEG Negative - -     Reviewed and updated as needed this visit by clinical staff  Tobacco  Allergies  Meds  Soc Hx        Reviewed and updated as needed this visit by Provider            ROS:  CONSTITUTIONAL: NEGATIVE for fever, chills, change in weight  INTEGUMENTARU/SKIN: NEGATIVE for worrisome rashes, moles or lesions  EYES: NEGATIVE for vision changes or irritation  ENT: NEGATIVE for ear, mouth and throat problems  RESP: NEGATIVE for significant cough or SOB  BREAST: NEGATIVE for masses, tenderness or discharge  CV: NEGATIVE for chest pain, palpitations or peripheral edema  GI: NEGATIVE for nausea, abdominal pain, heartburn, or change in bowel habits  : NEGATIVE for unusual urinary or vaginal symptoms. Periods are regular.  MUSCULOSKELETAL: NEGATIVE for significant arthralgias or myalgia  NEURO: NEGATIVE for weakness, dizziness or paresthesias  PSYCHIATRIC: NEGATIVE for changes in mood or affect    OBJECTIVE:   /71   Pulse 76   Temp 97.9  F (36.6  C) (Oral)   Ht 1.727 m (5' 8\")   Wt 65.8 kg (145 lb)   " LMP 04/23/2019   SpO2 99%   BMI 22.05 kg/m    EXAM:  GENERAL: healthy, alert and no distress  EYES: Eyes grossly normal to inspection, PERRL and conjunctivae and sclerae normal  HENT: ear canals and TM's normal, nose and mouth without ulcers or lesions  NECK: no adenopathy, no asymmetry, masses, or scars and thyroid normal to palpation  RESP: lungs clear to auscultation - no rales, rhonchi or wheezes  BREAST: normal without masses, tenderness or nipple discharge and no palpable axillary masses or adenopathy  CV: regular rate and rhythm, normal S1 S2, no S3 or S4, no murmur, click or rub, no peripheral edema and peripheral pulses strong  ABDOMEN: soft, nontender, no hepatosplenomegaly, no masses and bowel sounds normal  MS: no gross musculoskeletal defects noted, no edema  SKIN: no suspicious lesions or rashes  NEURO: Normal strength and tone, mentation intact and speech normal  PSYCH: mentation appears normal, affect normal/bright    Diagnostic Test Results:  No results found for this or any previous visit (from the past 24 hour(s)).    ASSESSMENT/PLAN:   1. Routine general medical examination at a health care facility    2. Other fatigue  Assessment.  39-year young female with vague symptoms of abdominal pain that resolved some nausea that she has had on and off.  She also complained of abdominal pain that is completely resolved.  She is confident she is not pregnant.  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  She does complain of some fatigue.  Plan we discussed blood test to rule out pregnancy and she would like to do it on her own.  We also discussed to proceed with basic blood test to rule out thyroid electrolyte imbalance glucose imbalance or anemia.  She is deferred all blood test and would make a lab only appointment if more concerns or recurring symptoms.  - Basic metabolic panel; Future  - TSH with free T4 reflex; Future  - CBC with platelets;  "Future    3. Nausea  Advised to complete home urine pregnancy test     4. Gastroesophageal reflux disease without esophagitis  History of reflux on and off   Advised over the counter tums or zantac twice daily     5. Recurrent cold sores  History of recurrent cold sore  - famciclovir (FAMVIR) 500 MG tablet; Take 2 tablets (1,000 mg) by mouth 2 times daily  Dispense: 40 tablet; Refill: 0  Potential medication side effects were discussed with the patient; let me know if any occur.      Follow-up in 4 weeks for recurring  concerns earlier if more concerns  COUNSELING:   Reviewed preventive health counseling, as reflected in patient instructions       Regular exercise       Healthy diet/nutrition    BP Readings from Last 1 Encounters:   05/01/19 110/71     Estimated body mass index is 22.05 kg/m  as calculated from the following:    Height as of this encounter: 1.727 m (5' 8\").    Weight as of this encounter: 65.8 kg (145 lb).           reports that she has never smoked. She has never used smokeless tobacco.      Counseling Resources:  ATP IV Guidelines  Pooled Cohorts Equation Calculator  Breast Cancer Risk Calculator  FRAX Risk Assessment  ICSI Preventive Guidelines  Dietary Guidelines for Americans, 2010  USDA's MyPlate  ASA Prophylaxis  Lung CA Screening    Barbara Campoverde MD  [facility]  "

## 2019-05-01 NOTE — PATIENT INSTRUCTIONS
Anemia, thyroid, electrolytes & glucose imbalance shoud be rule out based on the variable symptoms you described today  You can make a lab only appointment to get them landry in case of recurring symtoms with negative home urine pregnancy test    For stomach isses- make food diary, avoid foods causing or contribting to the symptoms  Over the counter ranitidine or zantac twice daily is a good choice for mild gder that too is a possibility      Preventive Health Recommendations  Female Ages 26 - 39  Yearly exam:   See your health care provider every year in order to    Review health changes.     Discuss preventive care.      Review your medicines if you your doctor has prescribed any.    Until age 30: Get a Pap test every three years (more often if you have had an abnormal result).    After age 30: Talk to your doctor about whether you should have a Pap test every 3 years or have a Pap test with HPV screening every 5 years.   You do not need a Pap test if your uterus was removed (hysterectomy) and you have not had cancer.  You should be tested each year for STDs (sexually transmitted diseases), if you're at risk.   Talk to your provider about how often to have your cholesterol checked.  If you are at risk for diabetes, you should have a diabetes test (fasting glucose).  Shots: Get a flu shot each year. Get a tetanus shot every 10 years.   Nutrition:     Eat at least 5 servings of fruits and vegetables each day.    Eat whole-grain bread, whole-wheat pasta and brown rice instead of white grains and rice.    Get adequate Calcium and Vitamin D.     Lifestyle    Exercise at least 150 minutes a week (30 minutes a day, 5 days of the week). This will help you control your weight and prevent disease.    Limit alcohol to one drink per day.    No smoking.     Wear sunscreen to prevent skin cancer.    See your dentist every six months for an exam and cleaning.

## 2019-05-08 PROBLEM — R11.0 NAUSEA: Status: ACTIVE | Noted: 2019-05-08

## 2019-05-08 PROBLEM — R53.83 OTHER FATIGUE: Status: ACTIVE | Noted: 2019-05-08

## 2019-11-07 ENCOUNTER — HEALTH MAINTENANCE LETTER (OUTPATIENT)
Age: 39
End: 2019-11-07

## 2020-10-22 ENCOUNTER — TELEPHONE (OUTPATIENT)
Dept: FAMILY MEDICINE | Facility: CLINIC | Age: 40
End: 2020-10-22

## 2020-10-22 DIAGNOSIS — B00.1 RECURRENT COLD SORES: ICD-10-CM

## 2020-10-22 NOTE — TELEPHONE ENCOUNTER
Reason for Call:  Medication or medication refill:    Do you use a [facility]?  Name of the pharmacy and phone number for the current request:       [id number] - [address]      Name of the medication requested: famciclovir (FAMVIR) 500 MG tablet [[id number]] (Order [id number])      Other request: pt would like to get this again to treat chronic cold sores. Would like a call to update if this requires OV or can just be sent right away  Can we leave a detailed message on this number? YES    Phone number patient can be reached at: Cell number on file:    Telephone Information:   Mobile [phone]       Best Time: any    Call taken on 10/22/2020 at 12:16 PM by Adolfo Gill

## 2020-10-22 NOTE — TELEPHONE ENCOUNTER
Last saw A.S. 5/2019.    Spoke with patient.  On flu clinic schedule for Monday 10/26.    Scheduled patient to see JS Monday 10/26, will get flu shot then.  Angie Harper RN

## 2020-10-26 ENCOUNTER — OFFICE VISIT (OUTPATIENT)
Dept: FAMILY MEDICINE | Facility: CLINIC | Age: 40
End: 2020-10-26
Payer: COMMERCIAL

## 2020-10-26 VITALS
BODY MASS INDEX: 23.19 KG/M2 | OXYGEN SATURATION: 98 % | DIASTOLIC BLOOD PRESSURE: 78 MMHG | WEIGHT: 153 LBS | HEART RATE: 87 BPM | RESPIRATION RATE: 20 BRPM | TEMPERATURE: 97.9 F | SYSTOLIC BLOOD PRESSURE: 122 MMHG | HEIGHT: 68 IN

## 2020-10-26 DIAGNOSIS — Z23 NEED FOR PROPHYLACTIC VACCINATION AND INOCULATION AGAINST INFLUENZA: ICD-10-CM

## 2020-10-26 DIAGNOSIS — B00.1 RECURRENT COLD SORES: Primary | ICD-10-CM

## 2020-10-26 PROCEDURE — 90471 IMMUNIZATION ADMIN: CPT | Performed by: PHYSICIAN ASSISTANT

## 2020-10-26 PROCEDURE — 99213 OFFICE O/P EST LOW 20 MIN: CPT | Mod: 25 | Performed by: PHYSICIAN ASSISTANT

## 2020-10-26 PROCEDURE — 90686 IIV4 VACC NO PRSV 0.5 ML IM: CPT | Performed by: PHYSICIAN ASSISTANT

## 2020-10-26 RX ORDER — FAMCICLOVIR 500 MG/1
1500 TABLET ORAL ONCE
Qty: 30 TABLET | Refills: 1 | Status: SHIPPED | OUTPATIENT
Start: 2020-10-26 | End: 2020-11-30

## 2020-10-26 RX ORDER — VALACYCLOVIR HYDROCHLORIDE 1 G/1
2000 TABLET, FILM COATED ORAL 2 TIMES DAILY
Qty: 16 TABLET | Refills: 4 | Status: SHIPPED | OUTPATIENT
Start: 2020-10-26 | End: 2020-11-30

## 2020-10-26 ASSESSMENT — MIFFLIN-ST. JEOR: SCORE: 1404.56

## 2020-10-26 NOTE — PROGRESS NOTES
"Subjective     Carmen Dorsey is a 40 year old female who presents to clinic today for the following health issues:    HPI         Medication Followup of famciclovir    Taking Medication as prescribed: yes - as needed for recurring cold sores     Side Effects:  yes - headache, fatigue, general malaise, head feels foggy     Medication Helping Symptoms:  yes     Has taken famvir for years for recurrent cold sores.  She does get some side effects, so she does not always take it.      Due for well exam, does plan to follow up with PCP for this in the near future.    Review of Systems   Constitutional, HEENT, cardiovascular, pulmonary, gi and gu systems are negative, except as otherwise noted.      Objective    Resp 20   Ht 1.715 m (5' 7.5\")   Wt 69.4 kg (153 lb)   BMI 23.61 kg/m    Body mass index is 23.61 kg/m .  Physical Exam   GENERAL: alert and no distress  EYES: Eyes grossly normal to inspection  RESP: lungs clear to auscultation - no rales, rhonchi or wheezes  CV: regular rate and rhythm, normal S1 S2, no S3 or S4, no murmur, click or rub, no peripheral edema and peripheral pulses strong  PSYCH: mentation appears normal, affect normal/bright    No results found for this or any previous visit (from the past 24 hour(s)).        Assessment & Plan     Recurrent cold sores  Since she has never tried valtrex, did offer to see if she tolerates it better.  She is interested.  Will also call in famvir in case she finds that one has less side effects.  - valACYclovir (VALTREX) 1000 mg tablet; Take 2 tablets (2,000 mg) by mouth 2 times daily for 1 day  - famciclovir (FAMVIR) 500 MG tablet; Take 3 tablets (1,500 mg) by mouth once for 1 dose    Need for prophylactic vaccination and inoculation against influenza    - INFLUENZA VACCINE IM > 6 MONTHS VALENT IIV4 [27550]  - Vaccine Administration, Initial [47957]        Will follow up with PCP for well exam.    No follow-ups on file.    KOMAL Patino " [facility]

## 2020-10-26 NOTE — NURSING NOTE
Prior to immunization administration, verified patients identity using patient s name and date of birth. Please see Immunization Activity for additional information.     Screening Questionnaire for Adult Immunization    Are you sick today?   No   Do you have allergies to medications, food, a vaccine component or latex?   No   Have you ever had a serious reaction after receiving a vaccination?   No   Do you have a long-term health problem with heart, lung, kidney, or metabolic disease (e.g., diabetes), asthma, a blood disorder, no spleen, complement component deficiency, a cochlear implant, or a spinal fluid leak?  Are you on long-term aspirin therapy?   No   Do you have cancer, leukemia, HIV/AIDS, or any other immune system problem?   No   Do you have a parent, brother, or sister with an immune system problem?   No   In the past 3 months, have you taken medications that affect  your immune system, such as prednisone, other steroids, or anticancer drugs; drugs for the treatment of rheumatoid arthritis, Crohn s disease, or psoriasis; or have you had radiation treatments?   No   Have you had a seizure, or a brain or other nervous system problem?   No   During the past year, have you received a transfusion of blood or blood    products, or been given immune (gamma) globulin or antiviral drug?   No   For women: Are you pregnant or is there a chance you could become       pregnant during the next month?   No   Have you received any vaccinations in the past 4 weeks?   No     Immunization questionnaire answers were all negative.        Per orders of Rodney Meadows PA-C, injection of Fluzone given by Zohra Houston MA. Patient instructed to remain in clinic for 15 minutes afterwards, and to report any adverse reaction to me immediately.       Screening performed by Zohra Houston MA on 10/26/2020 at 2:53 PM.  Zohra Houston MA on 10/26/2020 at 2:53 PM

## 2020-11-29 ENCOUNTER — HEALTH MAINTENANCE LETTER (OUTPATIENT)
Age: 40
End: 2020-11-29

## 2020-11-30 ENCOUNTER — OFFICE VISIT (OUTPATIENT)
Dept: FAMILY MEDICINE | Facility: CLINIC | Age: 40
End: 2020-11-30
Payer: COMMERCIAL

## 2020-11-30 VITALS
WEIGHT: 152.9 LBS | HEIGHT: 68 IN | HEART RATE: 71 BPM | TEMPERATURE: 97.8 F | RESPIRATION RATE: 16 BRPM | DIASTOLIC BLOOD PRESSURE: 79 MMHG | BODY MASS INDEX: 23.17 KG/M2 | OXYGEN SATURATION: 96 % | SYSTOLIC BLOOD PRESSURE: 116 MMHG

## 2020-11-30 DIAGNOSIS — B00.1 RECURRENT COLD SORES: ICD-10-CM

## 2020-11-30 DIAGNOSIS — Z12.31 ENCOUNTER FOR SCREENING MAMMOGRAM FOR MALIGNANT NEOPLASM OF BREAST: ICD-10-CM

## 2020-11-30 DIAGNOSIS — Z12.4 SCREENING FOR CERVICAL CANCER: ICD-10-CM

## 2020-11-30 DIAGNOSIS — Z00.00 ROUTINE GENERAL MEDICAL EXAMINATION AT A HEALTH CARE FACILITY: Primary | ICD-10-CM

## 2020-11-30 PROBLEM — R11.0 NAUSEA: Status: RESOLVED | Noted: 2019-05-08 | Resolved: 2020-11-30

## 2020-11-30 PROBLEM — K21.9 GASTROESOPHAGEAL REFLUX DISEASE WITHOUT ESOPHAGITIS: Status: RESOLVED | Noted: 2019-05-01 | Resolved: 2020-11-30

## 2020-11-30 PROBLEM — Z36.89 ENCOUNTER FOR TRIAGE IN PREGNANT PATIENT: Status: RESOLVED | Noted: 2017-06-05 | Resolved: 2020-11-30

## 2020-11-30 PROCEDURE — 80053 COMPREHEN METABOLIC PANEL: CPT | Performed by: FAMILY MEDICINE

## 2020-11-30 PROCEDURE — 99396 PREV VISIT EST AGE 40-64: CPT | Performed by: FAMILY MEDICINE

## 2020-11-30 PROCEDURE — G0145 SCR C/V CYTO,THINLAYER,RESCR: HCPCS | Performed by: FAMILY MEDICINE

## 2020-11-30 PROCEDURE — 36415 COLL VENOUS BLD VENIPUNCTURE: CPT | Performed by: FAMILY MEDICINE

## 2020-11-30 PROCEDURE — 87624 HPV HI-RISK TYP POOLED RSLT: CPT | Performed by: FAMILY MEDICINE

## 2020-11-30 RX ORDER — VALACYCLOVIR HYDROCHLORIDE 1 G/1
2000 TABLET, FILM COATED ORAL 2 TIMES DAILY
Qty: 30 TABLET | Refills: 4 | Status: SHIPPED | OUTPATIENT
Start: 2020-11-30 | End: 2021-12-08

## 2020-11-30 ASSESSMENT — MIFFLIN-ST. JEOR: SCORE: 1404.11

## 2020-11-30 NOTE — PATIENT INSTRUCTIONS
Call to schedule your imaging:for mammogram  [facility] [phone]  Preventive Health Recommendations  Female Ages 40 to 49    Yearly exam:     See your health care provider every year in order to  1. Review health changes.   2. Discuss preventive care.    3. Review your medicines if your doctor prescribed any.      Get a Pap test every three years (unless you have an abnormal result and your provider advises testing more often).      If you get Pap tests with HPV test, you only need to test every 5 years, unless you have an abnormal result. You do not need a Pap test if your uterus was removed (hysterectomy) and you have not had cancer.      You should be tested each year for STDs (sexually transmitted diseases), if you're at risk.     Ask your doctor if you should have a mammogram.      Have a colonoscopy (test for colon cancer) if someone in your family has had colon cancer or polyps before age 50.       Have a cholesterol test every 5 years.       Have a diabetes test (fasting glucose) after age 45. If you are at risk for diabetes, you should have this test every 3 years.    Shots: Get a flu shot each year. Get a tetanus shot every 10 years.     Nutrition:     Eat at least 5 servings of fruits and vegetables each day.    Eat whole-grain bread, whole-wheat pasta and brown rice instead of white grains and rice.    Get adequate Calcium and Vitamin D.      Lifestyle    Exercise at least 150 minutes a week (an average of 30 minutes a day, 5 days a week). This will help you control your weight and prevent disease.    Limit alcohol to one drink per day.    No smoking.     Wear sunscreen to prevent skin cancer.    See your dentist every six months for an exam and cleaning.

## 2020-11-30 NOTE — PROGRESS NOTES
SUBJECTIVE:   CC: Carmen Dorsey is an 40 year old woman who presents for preventive health visit.       Patient has been advised of split billing requirements and indicates understanding: Yes  Healthy Habits:     Getting at least 3 servings of Calcium per day:  Yes    Bi-annual eye exam:  NO    Dental care twice a year:  NO    Sleep apnea or symptoms of sleep apnea:  None    Diet:  Regular (no restrictions)    Frequency of exercise:  4-5 days/week    Duration of exercise:  45-60 minutes    Taking medications regularly:  Yes    Medication side effects:  Not applicable    PHQ-2 Total Score: 1    Additional concerns today:  Yes          PROBLEMS TO ADD ON...    Today's PHQ-2 Score:   PHQ-2 ( 1999 Pfizer) 11/24/2020   Q1: Little interest or pleasure in doing things 0   Q2: Feeling down, depressed or hopeless 1   PHQ-2 Score 1   Q1: Little interest or pleasure in doing things Not at all   Q2: Feeling down, depressed or hopeless Several days   PHQ-2 Score 1       Abuse: Current or Past (Physical, Sexual or Emotional) - No  Do you feel safe in your environment? Yes        Social History     Tobacco Use     Smoking status: Never Smoker     Smokeless tobacco: Never Used   Substance Use Topics     Alcohol use: Yes     Comment: occas     If you drink alcohol do you typically have >3 drinks per day or >7 drinks per week? No    No flowsheet data found.    Reviewed orders with patient.  Reviewed health maintenance and updated orders accordingly - Yes  BP Readings from Last 3 Encounters:   11/30/20 116/79   10/26/20 122/78   05/01/19 110/71    Wt Readings from Last 3 Encounters:   11/30/20 69.4 kg (152 lb 14.4 oz)   10/26/20 69.4 kg (153 lb)   05/01/19 65.8 kg (145 lb)                    Mammogram Screening: Patient over age 50, mutual decision to screen reflected in health maintenance.    Pertinent mammograms are reviewed under the imaging tab.  History of abnormal Pap smear: NO - age 30-65 PAP every 5 years with  "negative HPV co-testing recommended  PAP / HPV Latest Ref Rng & Units 7/7/2015 7/20/2007 7/6/2006   PAP - NIL NIL NIL   HPV 16 DNA NEG Negative - -   HPV 18 DNA NEG Negative - -   OTHER HR HPV NEG Negative - -     Reviewed and updated as needed this visit by clinical staff  Tobacco  Allergies  Meds  Problems  Med Hx  Surg Hx  Fam Hx          Reviewed and updated as needed this visit by Provider  Tobacco  Allergies  Meds  Problems  Med Hx  Surg Hx  Fam Hx             Review of Systems  CONSTITUTIONAL: NEGATIVE for fever, chills, change in weight  INTEGUMENTARU/SKIN: NEGATIVE for worrisome rashes, moles or lesions  EYES: NEGATIVE for vision changes or irritation  ENT: NEGATIVE for ear, mouth and throat problems  RESP: NEGATIVE for significant cough or SOB  BREAST: NEGATIVE for masses, tenderness or discharge  CV: NEGATIVE for chest pain, palpitations or peripheral edema  GI: NEGATIVE for nausea, abdominal pain, heartburn, or change in bowel habits  : NEGATIVE for unusual urinary or vaginal symptoms. Periods are regular.  MUSCULOSKELETAL: NEGATIVE for significant arthralgias or myalgia  NEURO: NEGATIVE for weakness, dizziness or paresthesias  ENDOCRINE: NEGATIVE for temperature intolerance, skin/hair changes  PSYCHIATRIC: NEGATIVE for changes in mood or affect     OBJECTIVE:   /79   Pulse 71   Temp 97.8  F (36.6  C) (Tympanic)   Resp 16   Ht 1.715 m (5' 7.5\")   Wt 69.4 kg (152 lb 14.4 oz)   LMP 11/23/2020 (Approximate)   SpO2 96%   BMI 23.59 kg/m    Physical Exam  GENERAL: healthy, alert and no distress  EYES: Eyes grossly normal to inspection, PERRL and conjunctivae and sclerae normal  HENT: ear canals and TM's normal, nose and mouth without ulcers or lesions  NECK: no adenopathy, no asymmetry, masses, or scars and thyroid normal to palpation  RESP: lungs clear to auscultation - no rales, rhonchi or wheezes  BREAST: normal without masses, tenderness or nipple discharge and no palpable " "axillary masses or adenopathy  CV: regular rate and rhythm, normal S1 S2, no S3 or S4, no murmur, click or rub, no peripheral edema and peripheral pulses strong  ABDOMEN: soft, nontender, no hepatosplenomegaly, no masses and bowel sounds normal   (female): normal female external genitalia, normal urethral meatus, vaginal mucosa pink, moist, well rugated, and normal cervix/adnexa/uterus without masses or discharge  MS: no gross musculoskeletal defects noted, no edema  SKIN: no suspicious lesions or rashes  NEURO: Normal strength and tone, mentation intact and speech normal  PSYCH: mentation appears normal, affect normal/bright    Diagnostic Test Results:  Labs reviewed in Epic  No results found for this or any previous visit (from the past 24 hour(s)).    ASSESSMENT/PLAN:   Carmen was seen today for physical.    Diagnoses and all orders for this visit:    Routine general medical examination at a health care facility    Screening for cervical cancer  -     Pap imaged thin layer screen with HPV - recommended age 30 - 65 years (select HPV order below)  -     HPV High Risk Types DNA Cervical      Encounter for screening mammogram for malignant neoplasm of breast  -     MA Screen Bilateral w/Curry; Future      Recurrent cold sores  -     valACYclovir (VALTREX) 1000 mg tablet; Take 2 tablets (2,000 mg) by mouth 2 times daily  -     Comprehensive metabolic panel (BMP + Alb, Alk Phos, ALT, AST, Total. Bili, TP)    Patient has been advised of split billing requirements and indicates understanding: Yes  COUNSELING:  Reviewed preventive health counseling, as reflected in patient instructions       Regular exercise       Healthy diet/nutrition       Vision screening       Hearing screening       Family planning    Estimated body mass index is 23.59 kg/m  as calculated from the following:    Height as of this encounter: 1.715 m (5' 7.5\").    Weight as of this encounter: 69.4 kg (152 lb 14.4 oz).        She reports that she has " never smoked. She has never used smokeless tobacco.      Counseling Resources:  ATP IV Guidelines  Pooled Cohorts Equation Calculator  Breast Cancer Risk Calculator  BRCA-Related Cancer Risk Assessment: FHS-7 Tool  FRAX Risk Assessment  ICSI Preventive Guidelines  Dietary Guidelines for Americans, 2010  USDA's MyPlate  ASA Prophylaxis  Lung CA Screening    Barbara Campoverde MD  [facility]

## 2020-12-01 LAB
ALBUMIN SERPL-MCNC: 4.1 G/DL (ref 3.4–5)
ALP SERPL-CCNC: 54 U/L (ref 40–150)
ALT SERPL W P-5'-P-CCNC: 18 U/L (ref 0–50)
ANION GAP SERPL CALCULATED.3IONS-SCNC: 4 MMOL/L (ref 3–14)
AST SERPL W P-5'-P-CCNC: 14 U/L (ref 0–45)
BILIRUB SERPL-MCNC: 1.1 MG/DL (ref 0.2–1.3)
BUN SERPL-MCNC: 13 MG/DL (ref 7–30)
CALCIUM SERPL-MCNC: 8.7 MG/DL (ref 8.5–10.1)
CHLORIDE SERPL-SCNC: 105 MMOL/L (ref 94–109)
CO2 SERPL-SCNC: 28 MMOL/L (ref 20–32)
CREAT SERPL-MCNC: 0.86 MG/DL (ref 0.52–1.04)
GFR SERPL CREATININE-BSD FRML MDRD: 84 ML/MIN/{1.73_M2}
GLUCOSE SERPL-MCNC: 86 MG/DL (ref 70–99)
POTASSIUM SERPL-SCNC: 3.9 MMOL/L (ref 3.4–5.3)
PROT SERPL-MCNC: 7.4 G/DL (ref 6.8–8.8)
SODIUM SERPL-SCNC: 137 MMOL/L (ref 133–144)

## 2020-12-01 NOTE — RESULT ENCOUNTER NOTE
-Liver and gallbladder tests are normal (ALT,AST, Alk phos, bilirubin), kidney function is normal (Cr, GFR), sodium is normal, potassium is normal, calcium is normal, glucose is normal.

## 2020-12-03 LAB
COPATH REPORT: NORMAL
PAP: NORMAL

## 2020-12-07 ENCOUNTER — PATIENT OUTREACH (OUTPATIENT)
Dept: FAMILY MEDICINE | Facility: CLINIC | Age: 40
End: 2020-12-07

## 2020-12-07 LAB
FINAL DIAGNOSIS: ABNORMAL
HPV HR 12 DNA CVX QL NAA+PROBE: POSITIVE
HPV16 DNA SPEC QL NAA+PROBE: NEGATIVE
HPV18 DNA SPEC QL NAA+PROBE: NEGATIVE
SPECIMEN DESCRIPTION: ABNORMAL
SPECIMEN SOURCE CVX/VAG CYTO: ABNORMAL

## 2020-12-15 ENCOUNTER — TELEPHONE (OUTPATIENT)
Dept: FAMILY MEDICINE | Facility: CLINIC | Age: 40
End: 2020-12-15

## 2020-12-15 NOTE — TELEPHONE ENCOUNTER
Patient Quality Outreach      Summary:    Patient has the following on her problem list/HM: None    Patient is due/failing the following:   Cervical Cancer Screening - PAP Needed    Type of outreach:    Scheduled    Questions for provider review:    None                                                                                     PATIENT CAME IN ON 11/30/2020 FOR PHYSICAL WITH PAP.                                              Zara Perez MA  Medical Assistant  [facility]         .

## 2020-12-17 ENCOUNTER — HOSPITAL ENCOUNTER (OUTPATIENT)
Dept: MAMMOGRAPHY | Facility: CLINIC | Age: 40
Discharge: HOME | End: 2020-12-17
Attending: FAMILY MEDICINE | Admitting: FAMILY MEDICINE
Payer: COMMERCIAL

## 2020-12-17 DIAGNOSIS — Z12.31 ENCOUNTER FOR SCREENING MAMMOGRAM FOR MALIGNANT NEOPLASM OF BREAST: ICD-10-CM

## 2020-12-17 PROCEDURE — 77063 BREAST TOMOSYNTHESIS BI: CPT

## 2021-09-25 ENCOUNTER — HEALTH MAINTENANCE LETTER (OUTPATIENT)
Age: 41
End: 2021-09-25

## 2021-11-11 ENCOUNTER — PATIENT OUTREACH (OUTPATIENT)
Dept: FAMILY MEDICINE | Facility: CLINIC | Age: 41
End: 2021-11-11
Payer: COMMERCIAL

## 2021-11-11 NOTE — LETTER
November 11, 2021      Carmen Velarde Sunita  [address] [address]        Dear MsAmira,    This letter is to remind you that you are due for your follow-up Pap smear and Human Papillomavirus (HPV) test.    Please call [phone] to schedule your appointment at your earliest convenience.    If you have completed the appointment outside of the [facility] system, please have the records forwarded to our office. We will update your chart for your provider to review before your next annual wellness visit.     Thank you for choosing [facility]!      Sincerely,    Your [facility] Care Team

## 2021-12-07 ASSESSMENT — ENCOUNTER SYMPTOMS
ABDOMINAL PAIN: 0
HEADACHES: 0
HEMATOCHEZIA: 0
FEVER: 0
JOINT SWELLING: 0
ARTHRALGIAS: 0
DIZZINESS: 0
CHILLS: 0
PALPITATIONS: 0
COUGH: 0
NAUSEA: 0
MYALGIAS: 0
HEMATURIA: 0
BREAST MASS: 0
PARESTHESIAS: 0
FREQUENCY: 0
DYSURIA: 0
HEARTBURN: 0
EYE PAIN: 0
WEAKNESS: 0
NERVOUS/ANXIOUS: 0
CONSTIPATION: 0
SHORTNESS OF BREATH: 0
SORE THROAT: 0
DIARRHEA: 0

## 2021-12-07 NOTE — PROGRESS NOTES
SUBJECTIVE:   CC: Carmen Dorsey is an 41 year old woman who presents for preventive health visit.       Patient has been advised of split billing requirements and indicates understanding: Yes  Healthy Habits:     Getting at least 3 servings of Calcium per day:  Yes    Bi-annual eye exam:  NO    Dental care twice a year:  Yes    Sleep apnea or symptoms of sleep apnea:  None    Diet:  Regular (no restrictions)    Frequency of exercise:  2-3 days/week    Duration of exercise:  45-60 minutes    Taking medications regularly:  Yes    Medication side effects:  Not applicable    PHQ-2 Total Score: 0    Additional concerns today:  Yes          PROBLEMS TO ADD ON...    Today's PHQ-2 Score:   PHQ-2 ( 1999 Pfizer) 12/7/2021   Q1: Little interest or pleasure in doing things 0   Q2: Feeling down, depressed or hopeless 0   PHQ-2 Score 0   PHQ-2 Total Score (12-17 Years)- Positive if 3 or more points; Administer PHQ-A if positive -   Q1: Little interest or pleasure in doing things Not at all   Q2: Feeling down, depressed or hopeless Not at all   PHQ-2 Score 0       Abuse: Current or Past (Physical, Sexual or Emotional) - No  Do you feel safe in your environment? Yes    Have you ever done Advance Care Planning? (For example, a Health Directive, POLST, or a discussion with a medical provider or your loved ones about your wishes): No, advance care planning information given to patient to review.  Patient declined advance care planning discussion at this time.    Social History     Tobacco Use     Smoking status: Never Smoker     Smokeless tobacco: Never Used   Substance Use Topics     Alcohol use: Yes     Comment: occas     If you drink alcohol do you typically have >3 drinks per day or >7 drinks per week? No    No flowsheet data found.    Reviewed orders with patient.  Reviewed health maintenance and updated orders accordingly - Yes  BP Readings from Last 3 Encounters:   12/08/21 115/76   11/30/20 116/79   10/26/20 122/78     Wt Readings from Last 3 Encounters:   12/08/21 69.2 kg (152 lb 9.6 oz)   11/30/20 69.4 kg (152 lb 14.4 oz)   10/26/20 69.4 kg (153 lb)                    Breast Cancer Screening:  Any new diagnosis of family breast, ovarian, or bowel cancer?     FHS-7:   Breast CA Risk Assessment (FHS-7) 12/7/2021   Did any of your first-degree relatives have breast or ovarian cancer? No   Did any of your relatives have bilateral breast cancer? No   Did any man in your family have breast cancer? No   Did any woman in your family have breast and ovarian cancer? Yes   Did any woman in your family have breast cancer before age 50 y? No   Do you have 2 or more relatives with breast and/or ovarian cancer? Yes   Do you have 2 or more relatives with breast and/or bowel cancer? Yes   paternal grand mom and great grad aunty on maternal side     Pertinent mammograms are reviewed under the imaging tab.    History of abnormal Pap smear: YES - updated in Problem List and Health Maintenance accordingly  PAP / HPV Latest Ref Rng & Units 11/30/2020 7/7/2015 7/20/2007   PAP (Historical) - NIL NIL NIL   HPV16 NEG:Negative Negative Negative -   HPV18 NEG:Negative Negative Negative -   HRHPV NEG:Negative Positive(A) Negative -     Reviewed and updated as needed this visit by clinical staff  Tobacco  Allergies  Meds  Problems  Med Hx  Surg Hx  Fam Hx         Reviewed and updated as needed this visit by Provider  Tobacco  Allergies  Meds  Problems  Med Hx  Surg Hx  Fam Hx          Review of Systems   Constitutional: Negative for chills and fever.   HENT: Negative for congestion, ear pain, hearing loss and sore throat.    Eyes: Negative for pain and visual disturbance.   Respiratory: Negative for cough and shortness of breath.    Cardiovascular: Negative for chest pain, palpitations and peripheral edema.   Gastrointestinal: Negative for abdominal pain, constipation, diarrhea, heartburn, hematochezia and nausea.   Breasts:  Negative for  "tenderness, breast mass and discharge.   Genitourinary: Negative for dysuria, frequency, genital sores, hematuria, pelvic pain, urgency, vaginal bleeding and vaginal discharge.   Musculoskeletal: Negative for arthralgias, joint swelling and myalgias.   Skin: Negative for rash.   Neurological: Negative for dizziness, weakness, headaches and paresthesias.   Psychiatric/Behavioral: Negative for mood changes. The patient is not nervous/anxious.       OBJECTIVE:   /76   Pulse 86   Temp 97.3  F (36.3  C) (Temporal)   Ht 1.71 m (5' 7.32\")   Wt 69.2 kg (152 lb 9.6 oz)   LMP 12/02/2021   SpO2 99%   BMI 23.67 kg/m    Physical Exam  GENERAL: healthy, alert and no distress  EYES: Eyes grossly normal to inspection, PERRL and conjunctivae and sclerae normal  HENT: ear canals and TM's normal, nose and mouth without ulcers or lesions  NECK: no adenopathy, no asymmetry, masses, or scars and thyroid normal to palpation  RESP: lungs clear to auscultation - no rales, rhonchi or wheezes  BREAST: healed surgical scars from  Breast reduction surgeries. normal without masses, tenderness or nipple discharge and no palpable axillary masses or adenopathy  CV: regular rate and rhythm, normal S1 S2, no S3 or S4, no murmur, click or rub, no peripheral edema and peripheral pulses strong  ABDOMEN: soft, nontender, no hepatosplenomegaly, no masses and bowel sounds normal   (female): normal female external genitalia, normal urethral meatus, vaginal mucosa pink, moist, well rugated, and normal cervix/adnexa/uterus without masses or discharge. Pap issent  MS: no gross musculoskeletal defects noted, no edema  SKIN: no suspicious lesions or rashes  NEURO: Normal strength and tone, mentation intact and speech normal  PSYCH: mentation appears normal, affect normal/bright    Diagnostic Test Results:  Labs reviewed in Epic    ASSESSMENT/PLAN:   Carmen was seen today for physical.    Diagnoses and all orders for this visit:    Routine general " "medical examination at a health care facility  Pap sent- if NILM- repeat ok in 5 yrs.  Mammogram baseline advised  Screening for cervical cancer  -     Pap imaged thin layer screen with HPV - recommended age 30 - 65 years (select HPV order below)  -     HPV Hold (Lab Only)    Encounter for screening mammogram for malignant neoplasm of breast  -     MA Screen Bilateral w/Curry; Future      Recurrent cold sores  - patient requested the switch     Discontinue: valACYclovir (VALTREX) 1000 mg tablet; Take 2 tablets (2,000 mg) by mouth 2 times daily  -     famciclovir (FAMVIR) 500 MG tablet; Take 2 tablets (1,000 mg) by mouth 2 times daily  -     Basic metabolic panel  (Ca, Cl, CO2, Creat, Gluc, K, Na, BUN); Future    Family history of pheochromocytoma  Comments:  mom daignosed in 2015- doing well in her 70's.      Family history of thyroid cancer  Comments:  mom daignosed in 2015- doing well in her 70's      Other orders  -     INFLUENZA VACCINE IM > 6 MONTHS VALENT IIV4 (AFLURIA/FLUZONE)        Patient has been advised of split billing requirements and indicates understanding: Yes  COUNSELING:  Reviewed preventive health counseling, as reflected in patient instructions       Regular exercise       Healthy diet/nutrition       Vision screening       Hearing screening    Estimated body mass index is 23.67 kg/m  as calculated from the following:    Height as of this encounter: 1.71 m (5' 7.32\").    Weight as of this encounter: 69.2 kg (152 lb 9.6 oz).        She reports that she has never smoked. She has never used smokeless tobacco.      Counseling Resources:  ATP IV Guidelines  Pooled Cohorts Equation Calculator  Breast Cancer Risk Calculator  BRCA-Related Cancer Risk Assessment: FHS-7 Tool  FRAX Risk Assessment  ICSI Preventive Guidelines  Dietary Guidelines for Americans, 2010  Know[employer]'s MyPlate  ASA Prophylaxis  Lung CA Screening    Barbara Campoverde MD  [facility]  "

## 2021-12-08 ENCOUNTER — OFFICE VISIT (OUTPATIENT)
Dept: FAMILY MEDICINE | Facility: CLINIC | Age: 41
End: 2021-12-08
Payer: COMMERCIAL

## 2021-12-08 VITALS
OXYGEN SATURATION: 99 % | WEIGHT: 152.6 LBS | SYSTOLIC BLOOD PRESSURE: 115 MMHG | HEART RATE: 86 BPM | DIASTOLIC BLOOD PRESSURE: 76 MMHG | TEMPERATURE: 97.3 F | BODY MASS INDEX: 23.95 KG/M2 | HEIGHT: 67 IN

## 2021-12-08 DIAGNOSIS — Z12.4 SCREENING FOR CERVICAL CANCER: ICD-10-CM

## 2021-12-08 DIAGNOSIS — Z12.31 ENCOUNTER FOR SCREENING MAMMOGRAM FOR MALIGNANT NEOPLASM OF BREAST: ICD-10-CM

## 2021-12-08 DIAGNOSIS — B00.1 RECURRENT COLD SORES: ICD-10-CM

## 2021-12-08 DIAGNOSIS — Z84.89 FAMILY HISTORY OF PHEOCHROMOCYTOMA: ICD-10-CM

## 2021-12-08 DIAGNOSIS — Z80.8 FAMILY HISTORY OF THYROID CANCER: ICD-10-CM

## 2021-12-08 DIAGNOSIS — Z00.00 ROUTINE GENERAL MEDICAL EXAMINATION AT A HEALTH CARE FACILITY: Primary | ICD-10-CM

## 2021-12-08 PROCEDURE — 87624 HPV HI-RISK TYP POOLED RSLT: CPT | Performed by: FAMILY MEDICINE

## 2021-12-08 PROCEDURE — G0145 SCR C/V CYTO,THINLAYER,RESCR: HCPCS | Performed by: FAMILY MEDICINE

## 2021-12-08 PROCEDURE — 90686 IIV4 VACC NO PRSV 0.5 ML IM: CPT | Performed by: FAMILY MEDICINE

## 2021-12-08 PROCEDURE — 99396 PREV VISIT EST AGE 40-64: CPT | Mod: 25 | Performed by: FAMILY MEDICINE

## 2021-12-08 PROCEDURE — 90471 IMMUNIZATION ADMIN: CPT | Performed by: FAMILY MEDICINE

## 2021-12-08 RX ORDER — FAMCICLOVIR 500 MG/1
1000 TABLET ORAL 2 TIMES DAILY
Qty: 40 TABLET | Refills: 0 | Status: SHIPPED | OUTPATIENT
Start: 2021-12-08 | End: 2022-12-28

## 2021-12-08 RX ORDER — VALACYCLOVIR HYDROCHLORIDE 1 G/1
2000 TABLET, FILM COATED ORAL 2 TIMES DAILY
Qty: 30 TABLET | Refills: 4 | Status: SHIPPED | OUTPATIENT
Start: 2021-12-08 | End: 2021-12-08

## 2021-12-08 ASSESSMENT — ENCOUNTER SYMPTOMS
HEMATOCHEZIA: 0
EYE PAIN: 0
NAUSEA: 0
DIZZINESS: 0
HEMATURIA: 0
CONSTIPATION: 0
SHORTNESS OF BREATH: 0
ARTHRALGIAS: 0
FEVER: 0
PARESTHESIAS: 0
ABDOMINAL PAIN: 0
MYALGIAS: 0
BREAST MASS: 0
DYSURIA: 0
NERVOUS/ANXIOUS: 0
CHILLS: 0
JOINT SWELLING: 0
HEARTBURN: 0
FREQUENCY: 0
WEAKNESS: 0
DIARRHEA: 0
SORE THROAT: 0
PALPITATIONS: 0
HEADACHES: 0
COUGH: 0

## 2021-12-08 ASSESSMENT — MIFFLIN-ST. JEOR: SCORE: 1394.94

## 2021-12-13 LAB
BKR LAB AP GYN ADEQUACY: NORMAL
BKR LAB AP GYN INTERPRETATION: NORMAL
BKR LAB AP HPV REFLEX: NORMAL
BKR LAB AP LMP: NORMAL
BKR LAB AP PREVIOUS ABNORMAL: NORMAL
PATH REPORT.COMMENTS IMP SPEC: NORMAL
PATH REPORT.COMMENTS IMP SPEC: NORMAL
PATH REPORT.RELEVANT HX SPEC: NORMAL

## 2021-12-15 LAB
HUMAN PAPILLOMA VIRUS 16 DNA: NEGATIVE
HUMAN PAPILLOMA VIRUS 18 DNA: NEGATIVE
HUMAN PAPILLOMA VIRUS FINAL DIAGNOSIS: NORMAL
HUMAN PAPILLOMA VIRUS OTHER HR: NEGATIVE

## 2021-12-16 ENCOUNTER — PATIENT OUTREACH (OUTPATIENT)
Dept: FAMILY MEDICINE | Facility: CLINIC | Age: 41
End: 2021-12-16
Payer: COMMERCIAL

## 2021-12-16 NOTE — RESULT ENCOUNTER NOTE
Cc'd to provider as FYI only due to hx.  No response needed unless prefer a change in plan.    Normal pap/Neg HPV letter sent through [employer] results. Next pap smear and HPV due in 1 year.     Kimi Ferro, RN, BSN  Pap Tracking Nurse

## 2022-06-20 ENCOUNTER — HOSPITAL ENCOUNTER (OUTPATIENT)
Dept: MAMMOGRAPHY | Facility: CLINIC | Age: 42
Discharge: HOME | End: 2022-06-20
Attending: FAMILY MEDICINE | Admitting: FAMILY MEDICINE
Payer: COMMERCIAL

## 2022-06-20 DIAGNOSIS — Z12.31 ENCOUNTER FOR SCREENING MAMMOGRAM FOR MALIGNANT NEOPLASM OF BREAST: ICD-10-CM

## 2022-06-20 PROCEDURE — 77067 SCR MAMMO BI INCL CAD: CPT

## 2022-11-22 ENCOUNTER — PATIENT OUTREACH (OUTPATIENT)
Dept: FAMILY MEDICINE | Facility: CLINIC | Age: 42
End: 2022-11-22

## 2022-11-22 DIAGNOSIS — R87.810 CERVICAL HIGH RISK HPV (HUMAN PAPILLOMAVIRUS) TEST POSITIVE: Primary | ICD-10-CM

## 2022-12-26 ENCOUNTER — HEALTH MAINTENANCE LETTER (OUTPATIENT)
Age: 42
End: 2022-12-26

## 2022-12-28 ENCOUNTER — MYC REFILL (OUTPATIENT)
Dept: FAMILY MEDICINE | Facility: CLINIC | Age: 42
End: 2022-12-28

## 2022-12-28 DIAGNOSIS — B00.1 RECURRENT COLD SORES: ICD-10-CM

## 2022-12-29 NOTE — TELEPHONE ENCOUNTER
Left message for patient to call [facility] back  Patient previously taking Valtrex 2,000mg daily  Ask patient if she is requesting to switch back to Valtrex instead of Famvir  Sarita WONG RN

## 2022-12-30 NOTE — TELEPHONE ENCOUNTER
Routing refill request to provider for review/approval because:  See MyChart message from patient.  Tamela DODGE RN

## 2023-01-02 RX ORDER — FAMCICLOVIR 500 MG/1
1000 TABLET ORAL 2 TIMES DAILY
Qty: 60 TABLET | Refills: 1 | Status: SHIPPED | OUTPATIENT
Start: 2023-01-02 | End: 2023-05-25

## 2023-01-02 NOTE — TELEPHONE ENCOUNTER
Patient called back.   Relayed Rx was sent.  Wondering if she can switch to 1000 mg tablets for the recommended dosage of 2000 mg a day.   Please advise.   Thanks!  Talisha LECHUGA

## 2023-01-02 NOTE — TELEPHONE ENCOUNTER
A.S,  Please see below and advise.  Unable to find pill for 1000 mg tablets.  Thanks.  Tamela DODGE RN

## 2023-01-02 NOTE — TELEPHONE ENCOUNTER
Okay to prescribe it verbally, if pharmacy has that.  I see only 500 mg available tablets.  Thanks

## 2023-01-02 NOTE — TELEPHONE ENCOUNTER
RN verified with [facility]- they do not have 1000 mg tablets.  Patient updated via [employer].  Tamela DODGE RN

## 2023-01-18 PROBLEM — R87.810 CERVICAL HIGH RISK HPV (HUMAN PAPILLOMAVIRUS) TEST POSITIVE: Status: ACTIVE | Noted: 2020-11-30

## 2023-02-03 ENCOUNTER — MYC MEDICAL ADVICE (OUTPATIENT)
Dept: FAMILY MEDICINE | Facility: CLINIC | Age: 43
End: 2023-02-03
Payer: COMMERCIAL

## 2023-02-13 ENCOUNTER — OFFICE VISIT (OUTPATIENT)
Dept: FAMILY MEDICINE | Facility: CLINIC | Age: 43
End: 2023-02-13
Payer: COMMERCIAL

## 2023-02-13 VITALS
BODY MASS INDEX: 24.17 KG/M2 | HEIGHT: 67 IN | DIASTOLIC BLOOD PRESSURE: 78 MMHG | RESPIRATION RATE: 16 BRPM | WEIGHT: 154 LBS | TEMPERATURE: 97.8 F | HEART RATE: 71 BPM | OXYGEN SATURATION: 99 % | SYSTOLIC BLOOD PRESSURE: 116 MMHG

## 2023-02-13 DIAGNOSIS — Z12.4 CERVICAL CANCER SCREENING: ICD-10-CM

## 2023-02-13 DIAGNOSIS — N92.6 IRREGULAR PERIODS: Primary | ICD-10-CM

## 2023-02-13 LAB
ERYTHROCYTE [DISTWIDTH] IN BLOOD BY AUTOMATED COUNT: 12 % (ref 10–15)
HCG UR QL: NEGATIVE
HCT VFR BLD AUTO: 39.7 % (ref 35–47)
HGB BLD-MCNC: 13.3 G/DL (ref 11.7–15.7)
MCH RBC QN AUTO: 32 PG (ref 26.5–33)
MCHC RBC AUTO-ENTMCNC: 33.5 G/DL (ref 31.5–36.5)
MCV RBC AUTO: 95 FL (ref 78–100)
PLATELET # BLD AUTO: 217 10E3/UL (ref 150–450)
RBC # BLD AUTO: 4.16 10E6/UL (ref 3.8–5.2)
WBC # BLD AUTO: 8.1 10E3/UL (ref 4–11)

## 2023-02-13 PROCEDURE — 0134A COVID-19 VACCINE BIVALENT BOOSTER 18+ (MODERNA): CPT | Performed by: FAMILY MEDICINE

## 2023-02-13 PROCEDURE — 91313 COVID-19 VACCINE BIVALENT BOOSTER 18+ (MODERNA): CPT | Performed by: FAMILY MEDICINE

## 2023-02-13 PROCEDURE — 36415 COLL VENOUS BLD VENIPUNCTURE: CPT | Performed by: FAMILY MEDICINE

## 2023-02-13 PROCEDURE — 99214 OFFICE O/P EST MOD 30 MIN: CPT | Performed by: FAMILY MEDICINE

## 2023-02-13 PROCEDURE — G0145 SCR C/V CYTO,THINLAYER,RESCR: HCPCS | Performed by: FAMILY MEDICINE

## 2023-02-13 PROCEDURE — 81025 URINE PREGNANCY TEST: CPT | Performed by: FAMILY MEDICINE

## 2023-02-13 PROCEDURE — 85027 COMPLETE CBC AUTOMATED: CPT | Performed by: FAMILY MEDICINE

## 2023-02-13 PROCEDURE — 87624 HPV HI-RISK TYP POOLED RSLT: CPT | Performed by: FAMILY MEDICINE

## 2023-02-13 ASSESSMENT — PATIENT HEALTH QUESTIONNAIRE - PHQ9
10. IF YOU CHECKED OFF ANY PROBLEMS, HOW DIFFICULT HAVE THESE PROBLEMS MADE IT FOR YOU TO DO YOUR WORK, TAKE CARE OF THINGS AT HOME, OR GET ALONG WITH OTHER PEOPLE: NOT DIFFICULT AT ALL
SUM OF ALL RESPONSES TO PHQ QUESTIONS 1-9: 0
SUM OF ALL RESPONSES TO PHQ QUESTIONS 1-9: 0

## 2023-02-13 ASSESSMENT — ANXIETY QUESTIONNAIRES
2. NOT BEING ABLE TO STOP OR CONTROL WORRYING: NOT AT ALL
IF YOU CHECKED OFF ANY PROBLEMS ON THIS QUESTIONNAIRE, HOW DIFFICULT HAVE THESE PROBLEMS MADE IT FOR YOU TO DO YOUR WORK, TAKE CARE OF THINGS AT HOME, OR GET ALONG WITH OTHER PEOPLE: NOT DIFFICULT AT ALL
4. TROUBLE RELAXING: NOT AT ALL
6. BECOMING EASILY ANNOYED OR IRRITABLE: NOT AT ALL
5. BEING SO RESTLESS THAT IT IS HARD TO SIT STILL: NOT AT ALL
GAD7 TOTAL SCORE: 0
7. FEELING AFRAID AS IF SOMETHING AWFUL MIGHT HAPPEN: NOT AT ALL
1. FEELING NERVOUS, ANXIOUS, OR ON EDGE: NOT AT ALL
GAD7 TOTAL SCORE: 0
3. WORRYING TOO MUCH ABOUT DIFFERENT THINGS: NOT AT ALL
GAD7 TOTAL SCORE: 0
7. FEELING AFRAID AS IF SOMETHING AWFUL MIGHT HAPPEN: NOT AT ALL
8. IF YOU CHECKED OFF ANY PROBLEMS, HOW DIFFICULT HAVE THESE MADE IT FOR YOU TO DO YOUR WORK, TAKE CARE OF THINGS AT HOME, OR GET ALONG WITH OTHER PEOPLE?: NOT DIFFICULT AT ALL

## 2023-02-13 NOTE — PROGRESS NOTES
Assessment & Plan     Irregular periods  Plan:  spotting since 23- prolonged spotting.  Slightly bulky uterus    Ultrasound to rule out fibroid & OR ENDOMETERIAL hyperplasia  Advised to keep menstural calender .  Monitor symptoms.  Will inform her about ultrasound when completed   - US Pelvic Transabdominal and Transvaginal; Future  -urine pregnancy test negative       Cervical cancer screening  PLAN: 20: NIL Pap, + HR HPV (not 16 or 18). Plan cotest in 1 year due by 21.   21 NIL pap, Neg HPV. Plan: cotest in 1 year  - Pap Screen with HPV - recommended age 30 - 65 years    Need for vaccination  Bivalenet moderna booster- given today    Ordering of each unique test  I spent a total of 35 minutes on the day of the visit.   Time spent doing chart review, history and exam, documentation and further activities per the note           Return in about 4 weeks (around 3/13/2023) for concerns,unresolved.   Follow-up Visit   Expected date:  2023 (Approximate)      Follow Up Appointment Details:     Follow-up with whom?: Me    Follow-Up for what?: Acute Issue Recheck    How?: In Person                    Barbara Campoverde MD  [facility]   Carmen is a 43 year old presenting for the following health issues:  Vaginal Bleeding      History of Present Illness       Reason for visit:  Spotting/bleeding  Symptom onset:  3-4 weeks ago  Symptoms include:  Spotting/bleeding  Symptom intensity:  Mild  Symptom progression:  Staying the same  Had these symptoms before:  No    She eats 2-3 servings of fruits and vegetables daily.She consumes 0 sweetened beverage(s) daily.She exercises with enough effort to increase her heart rate 30 to 60 minutes per day.  She exercises with enough effort to increase her heart rate 4 days per week.   She is taking medications regularly.    Today's PHQ-9         PHQ-9 Total Score: 0    PHQ-9 Q9 Thoughts of better off dead/self-harm past 2  "weeks :   Not at all    How difficult have these problems made it for you to do your work, take care of things at home, or get along with other people: Not difficult at all  Today's PEYTON-7 Score: 0   a 5 yp and 9 yo at home    Always regular periods 5-7 days every 21 days  In January - was expecting to have regular periods early January.  Instead has a couple days of spoting and had actually periods 23 & continued with prolonged spotting up until yesterday and feels onset of regular menstruation 23    Previously normal menstruation  Family planning- condoms .    Also due for pap and would like it completed    20: NIL Pap, + HR HPV (not 16 or 18). Plan cotest in 1 year due by 21.   21 NIL pap, Neg HPV.            Review of Systems   Constitutional, HEENT, cardiovascular, pulmonary, GI, , musculoskeletal, neuro, skin, endocrine and psych systems are negative, except as otherwise noted.      Objective    /78 (BP Location: Left arm, Patient Position: Sitting, Cuff Size: Adult Regular)   Pulse 71   Temp 97.8  F (36.6  C) (Temporal)   Resp 16   Ht 1.706 m (5' 7.15\")   Wt 69.9 kg (154 lb)   LMP  (LMP Unknown)   SpO2 99%   BMI 24.01 kg/m    Body mass index is 24.01 kg/m .  Physical Exam   GENERAL: healthy, alert and no distress  RESP: lungs clear to auscultation - no rales, rhonchi or wheezes  CV: regular rate and rhythm.  ABDOMEN: soft, nontender, no hepatosplenomegaly, no masses and bowel sounds normal   (female): normal female external genitalia, normal urethral meatus, vaginal mucosa, normal cervix/adnexa/uterus without masses. Scanty  menstrual blood. Pap is sent.  MS: no gross musculoskeletal defects noted, no edema    No results found for this or any previous visit (from the past 24 hour(s)).                "

## 2023-02-14 ENCOUNTER — MYC MEDICAL ADVICE (OUTPATIENT)
Dept: FAMILY MEDICINE | Facility: CLINIC | Age: 43
End: 2023-02-14
Payer: COMMERCIAL

## 2023-02-16 LAB
BKR LAB AP GYN ADEQUACY: NORMAL
BKR LAB AP GYN INTERPRETATION: NORMAL
BKR LAB AP HPV REFLEX: NORMAL
BKR LAB AP PREVIOUS ABNORMAL: NORMAL
PATH REPORT.COMMENTS IMP SPEC: NORMAL
PATH REPORT.COMMENTS IMP SPEC: NORMAL
PATH REPORT.RELEVANT HX SPEC: NORMAL

## 2023-03-08 ENCOUNTER — ANCILLARY PROCEDURE (OUTPATIENT)
Dept: ULTRASOUND IMAGING | Facility: CLINIC | Age: 43
End: 2023-03-08
Attending: FAMILY MEDICINE
Payer: COMMERCIAL

## 2023-03-08 DIAGNOSIS — N92.6 IRREGULAR PERIODS: ICD-10-CM

## 2023-03-08 PROCEDURE — 76856 US EXAM PELVIC COMPLETE: CPT | Performed by: OBSTETRICS & GYNECOLOGY

## 2023-03-08 PROCEDURE — 76830 TRANSVAGINAL US NON-OB: CPT | Performed by: OBSTETRICS & GYNECOLOGY

## 2023-03-19 ENCOUNTER — MYC MEDICAL ADVICE (OUTPATIENT)
Dept: FAMILY MEDICINE | Facility: CLINIC | Age: 43
End: 2023-03-19
Payer: COMMERCIAL

## 2023-03-19 DIAGNOSIS — N92.6 IRREGULAR PERIODS: Primary | ICD-10-CM

## 2023-04-22 ENCOUNTER — HEALTH MAINTENANCE LETTER (OUTPATIENT)
Age: 43
End: 2023-04-22

## 2023-05-18 ENCOUNTER — ANCILLARY PROCEDURE (OUTPATIENT)
Dept: ULTRASOUND IMAGING | Facility: CLINIC | Age: 43
End: 2023-05-18
Attending: FAMILY MEDICINE
Payer: COMMERCIAL

## 2023-05-18 DIAGNOSIS — N92.6 IRREGULAR PERIODS: ICD-10-CM

## 2023-05-18 PROCEDURE — 76856 US EXAM PELVIC COMPLETE: CPT | Performed by: OBSTETRICS & GYNECOLOGY

## 2023-05-18 PROCEDURE — 76830 TRANSVAGINAL US NON-OB: CPT | Performed by: OBSTETRICS & GYNECOLOGY

## 2023-05-18 ASSESSMENT — ENCOUNTER SYMPTOMS
HEMATURIA: 0
SORE THROAT: 0
MYALGIAS: 0
JOINT SWELLING: 0
ARTHRALGIAS: 0
COUGH: 0
NAUSEA: 0
FEVER: 0
HEARTBURN: 0
ABDOMINAL PAIN: 0
HEMATOCHEZIA: 0
CONSTIPATION: 0
PALPITATIONS: 0
DIZZINESS: 0
NERVOUS/ANXIOUS: 0
CHILLS: 0
BREAST MASS: 0
HEADACHES: 0
FREQUENCY: 0
SHORTNESS OF BREATH: 0
DIARRHEA: 0
WEAKNESS: 0
PARESTHESIAS: 0
DYSURIA: 0
EYE PAIN: 0

## 2023-05-25 ENCOUNTER — OFFICE VISIT (OUTPATIENT)
Dept: FAMILY MEDICINE | Facility: CLINIC | Age: 43
End: 2023-05-25
Payer: COMMERCIAL

## 2023-05-25 VITALS
RESPIRATION RATE: 16 BRPM | HEART RATE: 88 BPM | TEMPERATURE: 97.3 F | WEIGHT: 155.2 LBS | BODY MASS INDEX: 24.36 KG/M2 | DIASTOLIC BLOOD PRESSURE: 78 MMHG | HEIGHT: 67 IN | OXYGEN SATURATION: 97 % | SYSTOLIC BLOOD PRESSURE: 118 MMHG

## 2023-05-25 DIAGNOSIS — Z86.19 HX OF COLD SORES: ICD-10-CM

## 2023-05-25 DIAGNOSIS — Z12.31 ENCOUNTER FOR SCREENING MAMMOGRAM FOR MALIGNANT NEOPLASM OF BREAST: ICD-10-CM

## 2023-05-25 DIAGNOSIS — Z91.030 HISTORY OF BEE STING ALLERGY: ICD-10-CM

## 2023-05-25 DIAGNOSIS — Z00.00 ROUTINE GENERAL MEDICAL EXAMINATION AT A HEALTH CARE FACILITY: Primary | ICD-10-CM

## 2023-05-25 DIAGNOSIS — Z83.49 FAMILY HISTORY OF HYPOTHYROIDISM: ICD-10-CM

## 2023-05-25 LAB
ANION GAP SERPL CALCULATED.3IONS-SCNC: 9 MMOL/L (ref 7–15)
BUN SERPL-MCNC: 12 MG/DL (ref 6–20)
CALCIUM SERPL-MCNC: 9.5 MG/DL (ref 8.6–10)
CHLORIDE SERPL-SCNC: 105 MMOL/L (ref 98–107)
CREAT SERPL-MCNC: 0.86 MG/DL (ref 0.51–0.95)
DEPRECATED HCO3 PLAS-SCNC: 26 MMOL/L (ref 22–29)
GFR SERPL CREATININE-BSD FRML MDRD: 85 ML/MIN/1.73M2
GLUCOSE SERPL-MCNC: 93 MG/DL (ref 70–99)
POTASSIUM SERPL-SCNC: 4.3 MMOL/L (ref 3.4–5.3)
SODIUM SERPL-SCNC: 140 MMOL/L (ref 136–145)
TSH SERPL DL<=0.005 MIU/L-ACNC: 1.01 UIU/ML (ref 0.3–4.2)

## 2023-05-25 PROCEDURE — 36415 COLL VENOUS BLD VENIPUNCTURE: CPT | Performed by: FAMILY MEDICINE

## 2023-05-25 PROCEDURE — 84443 ASSAY THYROID STIM HORMONE: CPT | Performed by: FAMILY MEDICINE

## 2023-05-25 PROCEDURE — 99396 PREV VISIT EST AGE 40-64: CPT | Performed by: FAMILY MEDICINE

## 2023-05-25 PROCEDURE — 80048 BASIC METABOLIC PNL TOTAL CA: CPT | Performed by: FAMILY MEDICINE

## 2023-05-25 RX ORDER — VALACYCLOVIR HYDROCHLORIDE 1 G/1
2000 TABLET, FILM COATED ORAL 2 TIMES DAILY
Qty: 30 TABLET | Refills: 0 | Status: SHIPPED | OUTPATIENT
Start: 2023-05-25 | End: 2023-11-26

## 2023-05-25 RX ORDER — EPINEPHRINE 0.3 MG/.3ML
0.3 INJECTION SUBCUTANEOUS PRN
Qty: 2 EACH | Refills: 1 | Status: SHIPPED | OUTPATIENT
Start: 2023-05-25 | End: 2024-07-09

## 2023-05-25 RX ORDER — EPINEPHRINE 0.3 MG/.3ML
0.3 INJECTION SUBCUTANEOUS PRN
Qty: 2 EACH | Refills: 1 | Status: SHIPPED | OUTPATIENT
Start: 2023-05-25 | End: 2023-05-25

## 2023-05-25 ASSESSMENT — ENCOUNTER SYMPTOMS
COUGH: 0
FEVER: 0
ABDOMINAL PAIN: 0
ARTHRALGIAS: 0
DIZZINESS: 0
SORE THROAT: 0
JOINT SWELLING: 0
WEAKNESS: 0
CHILLS: 0
EYE PAIN: 0
PARESTHESIAS: 0
BREAST MASS: 0
MYALGIAS: 0
HEARTBURN: 0
DYSURIA: 0
HEMATURIA: 0
PALPITATIONS: 0
HEMATOCHEZIA: 0
NAUSEA: 0
SHORTNESS OF BREATH: 0
DIARRHEA: 0
HEADACHES: 0
FREQUENCY: 0
NERVOUS/ANXIOUS: 0
CONSTIPATION: 0

## 2023-05-25 ASSESSMENT — PAIN SCALES - GENERAL: PAINLEVEL: NO PAIN (0)

## 2023-05-25 NOTE — PROGRESS NOTES
SUBJECTIVE:   CC: Carmen is an 43 year old who presents for preventive health visit.        View : No data to display.              Patient has been advised of split billing requirements and indicates understanding: Yes  Healthy Habits:    Getting at least 3 servings of Calcium per day:  Yes    Bi-annual eye exam:  NO    Dental care twice a year:  Yes    Sleep apnea or symptoms of sleep apnea:  None    Diet:  Regular (no restrictions)    Frequency of exercise:  4-5 days/week    Duration of exercise:  30-45 minutes    Taking medications regularly:  Yes    Medication side effects:  Not applicable    PHQ-2 Total Score:    Additional concerns today:  No    Periods back to regular.  Ultrasound repeat completed and reported normal with resolution of left complex cyst.    Wasp allergy- leg swelled up.  Has very old epi pen.    Need refill on valtrex- works better for cold sores than anyother antiviral.              Social History     Tobacco Use     Smoking status: Never     Smokeless tobacco: Never   Vaping Use     Vaping status: Never Used   Substance Use Topics     Alcohol use: Yes     Comment: tin             5/18/2023    10:26 PM   Alcohol Use   Prescreen: >3 drinks/day or >7 drinks/week? No     Reviewed orders with patient.  Reviewed health maintenance and updated orders accordingly - Yes  BP Readings from Last 3 Encounters:   05/25/23 118/78   02/13/23 116/78   12/08/21 115/76    Wt Readings from Last 3 Encounters:   05/25/23 70.4 kg (155 lb 3.2 oz)   02/13/23 69.9 kg (154 lb)   12/08/21 69.2 kg (152 lb 9.6 oz)                    Breast Cancer Screening:    FHS-7:       12/7/2021     9:29 PM 6/20/2022    10:03 AM 5/18/2023    10:28 PM   Breast CA Risk Assessment (FHS-7)   Did any of your first-degree relatives have breast or ovarian cancer? No No No   Did any of your relatives have bilateral breast cancer? No No No   Did any man in your family have breast cancer? No No No   Did any woman in your family have  breast and ovarian cancer? Yes No Yes   Did any woman in your family have breast cancer before age 50 y? No No No   Do you have 2 or more relatives with breast and/or ovarian cancer? Yes Yes No   Do you have 2 or more relatives with breast and/or bowel cancer? Yes No No       Mammogram Screening - Offered annual screening and updated Health Maintenance for mutual plan based on risk factor consideration    Pertinent mammograms are reviewed under the imaging tab.    History of abnormal Pap smear: NO - age 30-65 PAP every 5 years with negative HPV co-testing recommended      Latest Ref Rng & Units 2/13/2023    11:07 AM 12/8/2021     3:55 PM 11/30/2020     3:15 PM   PAP / HPV   PAP  Negative for Intraepithelial Lesion or Malignancy (NILM)   Negative for Intraepithelial Lesion or Malignancy (NILM)      HPV 16 DNA Negative Negative   Negative   Negative     HPV 18 DNA Negative Negative   Negative   Negative     Other HR HPV Negative Negative   Negative   Positive       Reviewed and updated as needed this visit by clinical staff   Tobacco  Allergies  Meds  Problems  Med Hx  Surg Hx  Fam Hx          Reviewed and updated as needed this visit by Provider   Tobacco  Allergies  Meds  Problems  Med Hx  Surg Hx  Fam Hx             Review of Systems   Constitutional: Negative for chills and fever.   HENT: Negative for congestion, ear pain, hearing loss and sore throat.    Eyes: Negative for pain and visual disturbance.   Respiratory: Negative for cough and shortness of breath.    Cardiovascular: Negative for chest pain, palpitations and peripheral edema.   Gastrointestinal: Negative for abdominal pain, constipation, diarrhea, heartburn, hematochezia and nausea.   Breasts:  Negative for tenderness, breast mass and discharge.   Genitourinary: Negative for dysuria, frequency, genital sores, hematuria, pelvic pain, urgency, vaginal bleeding and vaginal discharge.   Musculoskeletal: Negative for arthralgias, joint  "swelling and myalgias.   Skin: Negative for rash.   Neurological: Negative for dizziness, weakness, headaches and paresthesias.   Psychiatric/Behavioral: Negative for mood changes. The patient is not nervous/anxious.           OBJECTIVE:   /78   Pulse 88   Temp 97.3  F (36.3  C) (Temporal)   Resp 16   Ht 1.706 m (5' 7.15\")   Wt 70.4 kg (155 lb 3.2 oz)   LMP 05/22/2023 (Exact Date)   SpO2 97%   BMI 24.20 kg/m    Physical Exam  GENERAL: healthy, alert and no distress  EYES: Eyes grossly normal to inspection, PERRL and conjunctivae and sclerae normal  HENT: ear canals and TM's normal, nose and mouth without ulcers or lesions  NECK: no adenopathy, no asymmetry, masses, or scars and thyroid normal to palpation  RESP: lungs clear to auscultation - no rales, rhonchi or wheezes  BREAST: healed surgical scar from  Breast reduction.normal without masses, tenderness or nipple discharge and no palpable axillary masses or adenopathy  CV: regular rate and rhythm, normal S1 S2, no S3 or S4, no murmur, click or rub, no peripheral edema and peripheral pulses strong  ABDOMEN: soft, nontender, no hepatosplenomegaly, no masses and bowel sounds normal  MS: no gross musculoskeletal defects noted, no edema  SKIN: no suspicious lesions or rashes  NEURO: Normal strength and tone, mentation intact and speech normal  PSYCH: mentation appears normal, affect normal/bright    Diagnostic Test Results:  Labs reviewed in Epic  No results found for this or any previous visit (from the past 24 hour(s)).    ASSESSMENT/PLAN:   Carmen was seen today for physical.    Diagnoses and all orders for this visit:    Routine general medical examination at a health care facility  Comments:  pap-NILM 03/2023- repeat in 5 yrs 03/2028    Encounter for screening mammogram for malignant neoplasm of breast  -     MA Screen Bilateral w/Curry; Future    Hx of cold sores  Comments:  valtrex sent as she requested-instead of famcicolvir. uses prn 2000mg bid for " 1 day  Orders:  -     valACYclovir (VALTREX) 1000 mg tablet; Take 2 tablets (2,000 mg) by mouth 2 times daily for 1 day  -     Basic metabolic panel  (Ca, Cl, CO2, Creat, Gluc, K, Na, BUN); Future    Family history of hypothyroidism  Comments:  mom has hypothyroidism  Orders:  -     TSH with free T4 reflex; Future    History of bee sting allergy  -     Discontinue: EPINEPHrine (ANY BX GENERIC EQUIV) 0.3 MG/0.3ML injection 2-pack; Inject 0.3 mLs (0.3 mg) into the muscle as needed for anaphylaxis May repeat one time in 5-15 minutes if response to initial dose is inadequate.  -     EPINEPHrine (ANY BX GENERIC EQUIV) 0.3 MG/0.3ML injection 2-pack; Inject 0.3 mLs (0.3 mg) into the muscle as needed for anaphylaxis May repeat one time in 5-15 minutes if response to initial dose is inadequate.      Resolved periods concerns - now regular and ultrasound shows resolved cyst.  Advised to follow up as needed   Uses condoms for family planning    Patient has been advised of split billing requirements and indicates understanding: Yes      COUNSELING:  Reviewed preventive health counseling, as reflected in patient instructions       Regular exercise       Healthy diet/nutrition       Vision screening       Family planning       Folic Acid        She reports that she has never smoked. She has never used smokeless tobacco.      Barbara Campoverde MD  [facility]

## 2023-07-05 ENCOUNTER — HOSPITAL ENCOUNTER (OUTPATIENT)
Dept: MAMMOGRAPHY | Facility: CLINIC | Age: 43
Discharge: HOME | End: 2023-07-05
Attending: FAMILY MEDICINE | Admitting: FAMILY MEDICINE
Payer: COMMERCIAL

## 2023-07-05 DIAGNOSIS — Z12.31 ENCOUNTER FOR SCREENING MAMMOGRAM FOR MALIGNANT NEOPLASM OF BREAST: ICD-10-CM

## 2023-07-05 PROCEDURE — 77067 SCR MAMMO BI INCL CAD: CPT

## 2023-11-26 ENCOUNTER — MYC REFILL (OUTPATIENT)
Dept: FAMILY MEDICINE | Facility: CLINIC | Age: 43
End: 2023-11-26

## 2023-11-26 DIAGNOSIS — Z86.19 HX OF COLD SORES: ICD-10-CM

## 2023-11-27 RX ORDER — VALACYCLOVIR HYDROCHLORIDE 1 G/1
2000 TABLET, FILM COATED ORAL 2 TIMES DAILY
Qty: 30 TABLET | Refills: 0 | Status: SHIPPED | OUTPATIENT
Start: 2023-11-27 | End: 2024-03-24

## 2024-03-24 ENCOUNTER — MYC REFILL (OUTPATIENT)
Dept: FAMILY MEDICINE | Facility: CLINIC | Age: 44
End: 2024-03-24
Payer: COMMERCIAL

## 2024-03-24 DIAGNOSIS — Z86.19 HX OF COLD SORES: ICD-10-CM

## 2024-03-25 RX ORDER — VALACYCLOVIR HYDROCHLORIDE 1 G/1
2000 TABLET, FILM COATED ORAL 2 TIMES DAILY
Qty: 30 TABLET | Refills: 0 | Status: SHIPPED | OUTPATIENT
Start: 2024-03-25

## 2024-04-25 ENCOUNTER — PATIENT OUTREACH (OUTPATIENT)
Dept: CARE COORDINATION | Facility: CLINIC | Age: 44
End: 2024-04-25
Payer: COMMERCIAL

## 2024-05-09 ENCOUNTER — PATIENT OUTREACH (OUTPATIENT)
Dept: CARE COORDINATION | Facility: CLINIC | Age: 44
End: 2024-05-09
Payer: COMMERCIAL

## 2024-07-06 SDOH — HEALTH STABILITY: PHYSICAL HEALTH: ON AVERAGE, HOW MANY DAYS PER WEEK DO YOU ENGAGE IN MODERATE TO STRENUOUS EXERCISE (LIKE A BRISK WALK)?: 4 DAYS

## 2024-07-06 SDOH — HEALTH STABILITY: PHYSICAL HEALTH: ON AVERAGE, HOW MANY MINUTES DO YOU ENGAGE IN EXERCISE AT THIS LEVEL?: 60 MIN

## 2024-07-06 ASSESSMENT — ANXIETY QUESTIONNAIRES
7. FEELING AFRAID AS IF SOMETHING AWFUL MIGHT HAPPEN: NOT AT ALL
3. WORRYING TOO MUCH ABOUT DIFFERENT THINGS: NOT AT ALL
GAD7 TOTAL SCORE: 0
6. BECOMING EASILY ANNOYED OR IRRITABLE: NOT AT ALL
2. NOT BEING ABLE TO STOP OR CONTROL WORRYING: NOT AT ALL
1. FEELING NERVOUS, ANXIOUS, OR ON EDGE: NOT AT ALL
4. TROUBLE RELAXING: NOT AT ALL
7. FEELING AFRAID AS IF SOMETHING AWFUL MIGHT HAPPEN: NOT AT ALL
GAD7 TOTAL SCORE: 0
5. BEING SO RESTLESS THAT IT IS HARD TO SIT STILL: NOT AT ALL
GAD7 TOTAL SCORE: 0

## 2024-07-06 ASSESSMENT — PATIENT HEALTH QUESTIONNAIRE - PHQ9
SUM OF ALL RESPONSES TO PHQ QUESTIONS 1-9: 0
SUM OF ALL RESPONSES TO PHQ QUESTIONS 1-9: 0

## 2024-07-06 ASSESSMENT — SOCIAL DETERMINANTS OF HEALTH (SDOH): HOW OFTEN DO YOU GET TOGETHER WITH FRIENDS OR RELATIVES?: ONCE A WEEK

## 2024-07-09 ENCOUNTER — OFFICE VISIT (OUTPATIENT)
Dept: FAMILY MEDICINE | Facility: CLINIC | Age: 44
End: 2024-07-09
Attending: FAMILY MEDICINE
Payer: COMMERCIAL

## 2024-07-09 VITALS
HEIGHT: 68 IN | SYSTOLIC BLOOD PRESSURE: 116 MMHG | HEART RATE: 98 BPM | DIASTOLIC BLOOD PRESSURE: 77 MMHG | TEMPERATURE: 97.5 F | WEIGHT: 153.3 LBS | OXYGEN SATURATION: 98 % | BODY MASS INDEX: 23.23 KG/M2 | RESPIRATION RATE: 16 BRPM

## 2024-07-09 DIAGNOSIS — N95.1 PERIMENOPAUSAL: ICD-10-CM

## 2024-07-09 DIAGNOSIS — Z86.19 HX OF COLD SORES: ICD-10-CM

## 2024-07-09 DIAGNOSIS — D22.9 NUMEROUS MOLES: ICD-10-CM

## 2024-07-09 DIAGNOSIS — Z91.030 HISTORY OF BEE STING ALLERGY: ICD-10-CM

## 2024-07-09 DIAGNOSIS — Z83.49 FAMILY HISTORY OF HYPOTHYROIDISM: ICD-10-CM

## 2024-07-09 DIAGNOSIS — Z00.00 ROUTINE GENERAL MEDICAL EXAMINATION AT A HEALTH CARE FACILITY: Primary | ICD-10-CM

## 2024-07-09 DIAGNOSIS — Z13.220 SCREENING FOR LIPID DISORDERS: ICD-10-CM

## 2024-07-09 PROCEDURE — 90480 ADMN SARSCOV2 VAC 1/ONLY CMP: CPT | Performed by: FAMILY MEDICINE

## 2024-07-09 PROCEDURE — 91320 SARSCV2 VAC 30MCG TRS-SUC IM: CPT | Performed by: FAMILY MEDICINE

## 2024-07-09 PROCEDURE — 99396 PREV VISIT EST AGE 40-64: CPT | Performed by: FAMILY MEDICINE

## 2024-07-09 RX ORDER — EPINEPHRINE 0.3 MG/.3ML
0.3 INJECTION SUBCUTANEOUS PRN
Qty: 2 EACH | Refills: 1 | Status: SHIPPED | OUTPATIENT
Start: 2024-07-09

## 2024-07-09 ASSESSMENT — PAIN SCALES - GENERAL: PAINLEVEL: NO PAIN (0)

## 2024-07-09 NOTE — PROGRESS NOTES
Preventive Care Visit  [facility]  Barbara Campoverde MD, Family Medicine  Jul 9, 2024      Assessment & Plan     Routine general medical examination at a health care facility  2/13/23 NIL, Neg HPV. Plan 3 yr co-test   Mammogram every 1-2 yrs, has it wendie for tomorrow     Numerous moles  - Adult Dermatology  Referral; Future    Hx of cold sores  - Basic metabolic panel  (Ca, Cl, CO2, Creat, Gluc, K, Na, BUN); Future    Family history of hypothyroidism  Plan: add TSH    Perimenopause  Plan: keep menstrual calendar     Screening for lipid disorders  - Lipid Profile (Chol, Trig, HDL, LDL calc); Future      Over the counter hydrocortisone as needed   Claritin as needed        Patient has been advised of split billing requirements and indicates understanding: Yes        Counseling  Appropriate preventive services were discussed with this patient, including applicable screening as appropriate for fall prevention, nutrition, physical activity, Tobacco-use cessation, weight loss and cognition.  Checklist reviewing preventive services available has been given to the patient.  Reviewed patient's diet, addressing concerns and/or questions.       Work on weight loss  Regular exercise    Best Morales is a 44 year old, presenting for the following:  Physical        7/9/2024    10:20 AM   Additional Questions   Roomed by neptali        Health Care Directive  Patient does not have a Health Care Directive or Living Will: Discussed advance care planning with patient; however, patient declined at this time.  Answers submitted by the patient for this visit:  Patient Health Questionnaire (Submitted on 7/6/2024)  PHQ9 TOTAL SCORE: 0  PEYTON-7 (Submitted on 7/6/2024)  PEYTON 7 TOTAL SCORE: 0    HPI  Swimmers itch- located on right side of abdomen -[employer] , prepares for [place] August 6th.  Callus/Wart on bottom of right foot   Discuss about precaution for  skin cancer - especially since  its summer   Perimenopause - irregular periods, skips a month or two.  Changes in skin,no concerns with mood or skin.            7/6/2024   General Health   How would you rate your overall physical health? Excellent   Feel stress (tense, anxious, or unable to sleep) Not at all            7/6/2024   Nutrition   Three or more servings of calcium each day? Yes   Diet: Regular (no restrictions)   How many servings of fruit and vegetables per day? (!) 2-3   How many sweetened beverages each day? 0-1            7/6/2024   Exercise   Days per week of moderate/strenous exercise 4 days   Average minutes spent exercising at this level 60 min            7/6/2024   Social Factors   Frequency of gathering with friends or relatives Once a week   Worry food won't last until get money to buy more No   Food not last or not have enough money for food? No   Do you have housing? (Housing is defined as stable permanent housing and does not include staying ouside in a car, in a tent, in an abandoned building, in an overnight shelter, or couch-surfing.) Yes   Are you worried about losing your housing? No   Lack of transportation? No   Unable to get utilities (heat,electricity)? No            7/6/2024   Dental   Dentist two times every year? Yes            7/6/2024   TB Screening   Were you born outside of the [place]? No          Today's PHQ-9 Score:       7/6/2024     9:51 AM   PHQ-9 SCORE   PHQ-9 Total Score MyChart 0   PHQ-9 Total Score 0         7/6/2024   Substance Use   Alcohol more than 3/day or more than 7/wk No   Do you use any other substances recreationally? No        Social History     Tobacco Use    Smoking status: Never    Smokeless tobacco: Never   Vaping Use    Vaping status: Never Used   Substance Use Topics    Alcohol use: Yes     Comment: occas    Drug use: No           7/5/2023   LAST FHS-7 RESULTS   1st degree relative breast or ovarian cancer No   Any relative bilateral breast cancer No   Any male have breast cancer No    Any ONE woman have BOTH breast AND ovarian cancer Yes   Any woman with breast cancer before 50yrs No   2 or more relatives with breast AND/OR ovarian cancer Yes   2 or more relatives with breast AND/OR bowel cancer No           Mammogram Screening - Mammogram every 1-2 years updated in Health Maintenance based on mutual decision making        7/6/2024   STI Screening   New sexual partner(s) since last STI/HIV test? No        History of abnormal Pap smear: No - age 30-64 HPV with reflex Pap every 5 years recommended        Latest Ref Rng & Units 2/13/2023    11:07 AM 12/8/2021     3:55 PM 11/30/2020     3:15 PM   PAP / HPV   PAP  Negative for Intraepithelial Lesion or Malignancy (NILM)  Negative for Intraepithelial Lesion or Malignancy (NILM)     HPV 16 DNA Negative Negative  Negative  Negative    HPV 18 DNA Negative Negative  Negative  Negative    Other HR HPV Negative Negative  Negative  Positive      ASCVD Risk   The ASCVD Risk score (Adama THOMASON, et al., 2019) failed to calculate for the following reasons:    Cannot find a previous HDL lab    Cannot find a previous total cholesterol lab        7/6/2024   Contraception/Family Planning   Questions about contraception or family planning No           Reviewed and updated as needed this visit by Provider   Tobacco  Allergies  Meds  Problems  Med Hx  Surg Hx  Fam Hx            BP Readings from Last 3 Encounters:   07/09/24 116/77   05/25/23 118/78   02/13/23 116/78    Wt Readings from Last 3 Encounters:   07/09/24 69.5 kg (153 lb 4.8 oz)   05/25/23 70.4 kg (155 lb 3.2 oz)   02/13/23 69.9 kg (154 lb)                      Review of Systems  Constitutional, neuro, ENT, endocrine, pulmonary, cardiac, gastrointestinal, genitourinary, musculoskeletal, integument and psychiatric systems are negative, except as otherwise noted.     Objective    Exam  /77 (BP Location: Left arm, Patient Position: Sitting, Cuff Size: Adult Regular)   Pulse 98   Temp 97.5  " F (36.4  C) (Temporal)   Resp 16   Ht 1.719 m (5' 7.68\")   Wt 69.5 kg (153 lb 4.8 oz)   LMP 06/11/2024   SpO2 98%   Breastfeeding No   BMI 23.53 kg/m     Estimated body mass index is 23.53 kg/m  as calculated from the following:    Height as of this encounter: 1.719 m (5' 7.68\").    Weight as of this encounter: 69.5 kg (153 lb 4.8 oz).    Physical Exam  GENERAL: alert and no distress  EYES: Eyes grossly normal to inspection, PERRL and conjunctivae and sclerae normal  HENT: ear canals and TM's normal, nose and mouth without ulcers or lesions  NECK: no adenopathy, no asymmetry, masses, or scars  RESP: lungs clear to auscultation - no rales, rhonchi or wheezes  BREAST: normal without masses, tenderness or nipple discharge and no palpable axillary masses or adenopathy  CV: regular rate and rhythm, normal S1 S2, no S3 or S4, no murmur, click or rub, no peripheral edema  ABDOMEN: soft, nontender, no hepatosplenomegaly, no masses and bowel sounds normal  MS: no gross musculoskeletal defects noted, no edema  SKIN: numerous moles , and occassinal papules with mild excoriation only on torso in front.  NEURO: Normal strength and tone, mentation intact and speech normal  PSYCH: mentation appears normal, affect normal/bright        Signed Electronically by: Barbara Campoverde MD    "

## 2024-07-09 NOTE — PATIENT INSTRUCTIONS
Patient Education   Preventive Care Advice   This is general advice given by our system to help you stay healthy. However, your care team may have specific advice just for you. Please talk to your care team about your preventive care needs.  Nutrition  Eat 5 or more servings of fruits and vegetables each day.  Try wheat bread, brown rice and whole grain pasta (instead of white bread, rice, and pasta).  Get enough calcium and vitamin D. Check the label on foods and aim for 100% of the RDA (recommended daily allowance).  Lifestyle  Exercise at least 150 minutes each week  (30 minutes a day, 5 days a week).  Do muscle strengthening activities 2 days a week. These help control your weight and prevent disease.  No smoking.  Wear sunscreen to prevent skin cancer.  Have a dental exam and cleaning every 6 months.  Yearly exams  See your health care team every year to talk about:  Any changes in your health.  Any medicines your care team has prescribed.  Preventive care, family planning, and ways to prevent chronic diseases.  Shots (vaccines)   HPV shots (up to age 26), if you've never had them before.  Hepatitis B shots (up to age 59), if you've never had them before.  COVID-19 shot: Get this shot when it's due.  Flu shot: Get a flu shot every year.  Tetanus shot: Get a tetanus shot every 10 years.  Pneumococcal, hepatitis A, and RSV shots: Ask your care team if you need these based on your risk.  Shingles shot (for age 50 and up)  General health tests  Diabetes screening:  Starting at age 35, Get screened for diabetes at least every 3 years.  If you are younger than age 35, ask your care team if you should be screened for diabetes.  Cholesterol test: At age 39, start having a cholesterol test every 5 years, or more often if advised.  Bone density scan (DEXA): At age 50, ask your care team if you should have this scan for osteoporosis (brittle bones).  Hepatitis C: Get tested at least once in your life.  STIs (sexually  transmitted infections)  Before age 24: Ask your care team if you should be screened for STIs.  After age 24: Get screened for STIs if you're at risk. You are at risk for STIs (including HIV) if:  You are sexually active with more than one person.  You don't use condoms every time.  You or a partner was diagnosed with a sexually transmitted infection.  If you are at risk for HIV, ask about PrEP medicine to prevent HIV.  Get tested for HIV at least once in your life, whether you are at risk for HIV or not.  Cancer screening tests  Cervical cancer screening: If you have a cervix, begin getting regular cervical cancer screening tests starting at age 21.  Breast cancer scan (mammogram): If you've ever had breasts, begin having regular mammograms starting at age 40. This is a scan to check for breast cancer.  Colon cancer screening: It is important to start screening for colon cancer at age 45.  Have a colonoscopy test every 10 years (or more often if you're at risk) Or, ask your provider about stool tests like a FIT test every year or Cologuard test every 3 years.  To learn more about your testing options, visit:   .  For help making a decision, visit:   https://bit.ly/qc18406.  Prostate cancer screening test: If you have a prostate, ask your care team if a prostate cancer screening test (PSA) at age 55 is right for you.  Lung cancer screening: If you are a current or former smoker ages 50 to 80, ask your care team if ongoing lung cancer screenings are right for you.  For informational purposes only. Not to replace the advice of your health care provider. Copyright   2023 [employer]. All rights reserved. Clinically reviewed by the [facility] Transitions Program. [employer] AB [id number] - REV 01/24.

## 2024-07-09 NOTE — NURSING NOTE
Prior to immunization administration, verified patients identity using patient s name and date of birth. Please see Immunization Activity for additional information.     Screening Questionnaire for Adult Immunization    Are you sick today?   No   Do you have allergies to medications, food, a vaccine component or latex?   No   Have you ever had a serious reaction after receiving a vaccination?   No   Do you have a long-term health problem with heart, lung, kidney, or metabolic disease (e.g., diabetes), asthma, a blood disorder, no spleen, complement component deficiency, a cochlear implant, or a spinal fluid leak?  Are you on long-term aspirin therapy?   No   Do you have cancer, leukemia, HIV/AIDS, or any other immune system problem?   No   Do you have a parent, brother, or sister with an immune system problem?   No   In the past 3 months, have you taken medications that affect  your immune system, such as prednisone, other steroids, or anticancer drugs; drugs for the treatment of rheumatoid arthritis, Crohn s disease, or psoriasis; or have you had radiation treatments?   No   Have you had a seizure, or a brain or other nervous system problem?   No   During the past year, have you received a transfusion of blood or blood    products, or been given immune (gamma) globulin or antiviral drug?   No   For women: Are you pregnant or is there a chance you could become       pregnant during the next month?   No   Have you received any vaccinations in the past 4 weeks?   No     Immunization questionnaire answers were all negative.      Patient instructed to remain in clinic for 15 minutes afterwards, and to report any adverse reactions.     Screening performed by Raheem Jin MA on 7/9/2024 at 12:08 PM.

## 2024-07-10 ENCOUNTER — ANCILLARY PROCEDURE (OUTPATIENT)
Dept: MAMMOGRAPHY | Facility: CLINIC | Age: 44
End: 2024-07-10
Attending: FAMILY MEDICINE
Payer: COMMERCIAL

## 2024-07-10 DIAGNOSIS — Z12.31 VISIT FOR SCREENING MAMMOGRAM: ICD-10-CM

## 2024-07-10 PROCEDURE — 77063 BREAST TOMOSYNTHESIS BI: CPT | Mod: 26 | Performed by: RADIOLOGY

## 2024-07-10 PROCEDURE — 77063 BREAST TOMOSYNTHESIS BI: CPT

## 2024-07-10 PROCEDURE — 77067 SCR MAMMO BI INCL CAD: CPT | Mod: 26 | Performed by: RADIOLOGY

## 2025-01-13 ENCOUNTER — IMMUNIZATION (OUTPATIENT)
Dept: FAMILY MEDICINE | Facility: CLINIC | Age: 45
End: 2025-01-13
Payer: COMMERCIAL

## 2025-01-13 DIAGNOSIS — Z23 ENCOUNTER FOR IMMUNIZATION: Primary | ICD-10-CM

## 2025-01-13 PROCEDURE — 90471 IMMUNIZATION ADMIN: CPT

## 2025-01-13 PROCEDURE — 99207 PR NO CHARGE NURSE ONLY: CPT

## 2025-01-13 PROCEDURE — 90656 IIV3 VACC NO PRSV 0.5 ML IM: CPT

## 2025-01-13 NOTE — PROGRESS NOTES
Prior to immunization administration, verified patients identity using patient s name and date of birth. Please see Immunization Activity for additional information.     Is the patient's temperature normal (100.5 or less)? Yes     Patient MEETS CRITERIA. PROCEED with vaccine administration.      Patient instructed to remain in clinic for 15 minutes afterwards, and to report any adverse reactions.      Link to Ancillary Visit Immunization Standing Orders SmartSet     Screening performed by Sigrid Felix on 1/13/2025 at 3:55 PM.

## 2025-04-03 ENCOUNTER — TELEPHONE (OUTPATIENT)
Dept: DERMATOLOGY | Facility: CLINIC | Age: 45
End: 2025-04-03
Payer: COMMERCIAL

## 2025-04-03 NOTE — TELEPHONE ENCOUNTER
4/3 Left Voicemail (1st Attempt) and Sent Mychart (1st Attempt) for the patient to call back and schedule the following:    Appointment type: Return Dermatology  Provider: Mark  Return date: 4/17/26  Specialty phone number: [phone]  Additional appointment(s) needed: na  Additonal Notes: na

## 2025-06-09 ENCOUNTER — PATIENT OUTREACH (OUTPATIENT)
Dept: CARE COORDINATION | Facility: CLINIC | Age: 45
End: 2025-06-09
Payer: COMMERCIAL

## 2025-07-14 ENCOUNTER — ANCILLARY PROCEDURE (OUTPATIENT)
Dept: MAMMOGRAPHY | Facility: CLINIC | Age: 45
End: 2025-07-14
Attending: FAMILY MEDICINE
Payer: COMMERCIAL

## 2025-07-14 DIAGNOSIS — Z12.31 VISIT FOR SCREENING MAMMOGRAM: ICD-10-CM

## 2025-07-14 PROCEDURE — 77063 BREAST TOMOSYNTHESIS BI: CPT | Mod: 26 | Performed by: STUDENT IN AN ORGANIZED HEALTH CARE EDUCATION/TRAINING PROGRAM

## 2025-07-14 PROCEDURE — 77063 BREAST TOMOSYNTHESIS BI: CPT

## 2025-07-14 PROCEDURE — 77067 SCR MAMMO BI INCL CAD: CPT | Mod: 26 | Performed by: STUDENT IN AN ORGANIZED HEALTH CARE EDUCATION/TRAINING PROGRAM

## 2025-07-21 SDOH — HEALTH STABILITY: PHYSICAL HEALTH: ON AVERAGE, HOW MANY DAYS PER WEEK DO YOU ENGAGE IN MODERATE TO STRENUOUS EXERCISE (LIKE A BRISK WALK)?: 5 DAYS

## 2025-07-21 SDOH — HEALTH STABILITY: PHYSICAL HEALTH: ON AVERAGE, HOW MANY MINUTES DO YOU ENGAGE IN EXERCISE AT THIS LEVEL?: 40 MIN

## 2025-07-21 ASSESSMENT — ANXIETY QUESTIONNAIRES
1. FEELING NERVOUS, ANXIOUS, OR ON EDGE: NOT AT ALL
7. FEELING AFRAID AS IF SOMETHING AWFUL MIGHT HAPPEN: NOT AT ALL
GAD7 TOTAL SCORE: 0
4. TROUBLE RELAXING: NOT AT ALL
GAD7 TOTAL SCORE: 0
3. WORRYING TOO MUCH ABOUT DIFFERENT THINGS: NOT AT ALL
5. BEING SO RESTLESS THAT IT IS HARD TO SIT STILL: NOT AT ALL
6. BECOMING EASILY ANNOYED OR IRRITABLE: NOT AT ALL
2. NOT BEING ABLE TO STOP OR CONTROL WORRYING: NOT AT ALL
7. FEELING AFRAID AS IF SOMETHING AWFUL MIGHT HAPPEN: NOT AT ALL
GAD7 TOTAL SCORE: 0
IF YOU CHECKED OFF ANY PROBLEMS ON THIS QUESTIONNAIRE, HOW DIFFICULT HAVE THESE PROBLEMS MADE IT FOR YOU TO DO YOUR WORK, TAKE CARE OF THINGS AT HOME, OR GET ALONG WITH OTHER PEOPLE: NOT DIFFICULT AT ALL
8. IF YOU CHECKED OFF ANY PROBLEMS, HOW DIFFICULT HAVE THESE MADE IT FOR YOU TO DO YOUR WORK, TAKE CARE OF THINGS AT HOME, OR GET ALONG WITH OTHER PEOPLE?: NOT DIFFICULT AT ALL

## 2025-07-21 ASSESSMENT — SOCIAL DETERMINANTS OF HEALTH (SDOH): HOW OFTEN DO YOU GET TOGETHER WITH FRIENDS OR RELATIVES?: ONCE A WEEK

## 2025-07-22 ENCOUNTER — OFFICE VISIT (OUTPATIENT)
Dept: FAMILY MEDICINE | Facility: CLINIC | Age: 45
End: 2025-07-22
Attending: FAMILY MEDICINE
Payer: COMMERCIAL

## 2025-07-22 VITALS
DIASTOLIC BLOOD PRESSURE: 73 MMHG | SYSTOLIC BLOOD PRESSURE: 105 MMHG | HEART RATE: 68 BPM | RESPIRATION RATE: 16 BRPM | HEIGHT: 68 IN | WEIGHT: 151 LBS | BODY MASS INDEX: 22.88 KG/M2 | TEMPERATURE: 97.2 F | OXYGEN SATURATION: 98 %

## 2025-07-22 DIAGNOSIS — Z13.1 SCREENING FOR DIABETES MELLITUS: ICD-10-CM

## 2025-07-22 DIAGNOSIS — Z12.11 COLON CANCER SCREENING: ICD-10-CM

## 2025-07-22 DIAGNOSIS — Z00.00 ROUTINE GENERAL MEDICAL EXAMINATION AT A HEALTH CARE FACILITY: Primary | ICD-10-CM

## 2025-07-22 DIAGNOSIS — N95.1 PERIMENOPAUSE: ICD-10-CM

## 2025-07-22 DIAGNOSIS — Z13.6 SCREENING FOR CARDIOVASCULAR CONDITION: ICD-10-CM

## 2025-07-22 DIAGNOSIS — Z91.030 HISTORY OF BEE STING ALLERGY: ICD-10-CM

## 2025-07-22 DIAGNOSIS — Z86.19 HX OF COLD SORES: ICD-10-CM

## 2025-07-22 DIAGNOSIS — R23.2 HOT FLASHES: ICD-10-CM

## 2025-07-22 DIAGNOSIS — Z84.89 FAMILY HISTORY OF PHEOCHROMOCYTOMA: ICD-10-CM

## 2025-07-22 PROCEDURE — 3078F DIAST BP <80 MM HG: CPT | Performed by: FAMILY MEDICINE

## 2025-07-22 PROCEDURE — 3074F SYST BP LT 130 MM HG: CPT | Performed by: FAMILY MEDICINE

## 2025-07-22 PROCEDURE — 99396 PREV VISIT EST AGE 40-64: CPT | Performed by: FAMILY MEDICINE

## 2025-07-22 RX ORDER — EPINEPHRINE 0.3 MG/.3ML
0.3 INJECTION SUBCUTANEOUS PRN
Qty: 2 EACH | Refills: 1 | Status: SHIPPED | OUTPATIENT
Start: 2025-07-22

## 2025-07-22 RX ORDER — VALACYCLOVIR HYDROCHLORIDE 1 G/1
2000 TABLET, FILM COATED ORAL 2 TIMES DAILY
Qty: 30 TABLET | Refills: 0 | Status: SHIPPED | OUTPATIENT
Start: 2025-07-22

## 2025-07-22 NOTE — PATIENT INSTRUCTIONS
Patient Education   Preventive Care Advice   This is general advice given by our system to help you stay healthy. However, your care team may have specific advice just for you. Please talk to your care team about your preventive care needs.  Nutrition  Eat 5 or more servings of fruits and vegetables each day.  Try wheat bread, brown rice and whole grain pasta (instead of white bread, rice, and pasta).  Get enough calcium and vitamin D. Check the label on foods and aim for 100% of the RDA (recommended daily allowance).  Lifestyle  Exercise at least 150 minutes each week  (30 minutes a day, 5 days a week).  Do muscle strengthening activities 2 days a week. These help control your weight and prevent disease.  No smoking.  Wear sunscreen to prevent skin cancer.  Have a dental exam and cleaning every 6 months.  Yearly exams  See your health care team every year to talk about:  Any changes in your health.  Any medicines your care team has prescribed.  Preventive care, family planning, and ways to prevent chronic diseases.  Shots (vaccines)   HPV shots (up to age 26), if you've never had them before.  Hepatitis B shots (up to age 59), if you've never had them before.  COVID-19 shot: Get this shot when it's due.  Flu shot: Get a flu shot every year.  Tetanus shot: Get a tetanus shot every 10 years.  Pneumococcal, hepatitis A, and RSV shots: Ask your care team if you need these based on your risk.  Shingles shot (for age 50 and up)  General health tests  Diabetes screening:  Starting at age 35, Get screened for diabetes at least every 3 years.  If you are younger than age 35, ask your care team if you should be screened for diabetes.  Cholesterol test: At age 39, start having a cholesterol test every 5 years, or more often if advised.  Bone density scan (DEXA): At age 50, ask your care team if you should have this scan for osteoporosis (brittle bones).  Hepatitis C: Get tested at least once in your life.  STIs (sexually  transmitted infections)  Before age 24: Ask your care team if you should be screened for STIs.  After age 24: Get screened for STIs if you're at risk. You are at risk for STIs (including HIV) if:  You are sexually active with more than one person.  You don't use condoms every time.  You or a partner was diagnosed with a sexually transmitted infection.  If you are at risk for HIV, ask about PrEP medicine to prevent HIV.  Get tested for HIV at least once in your life, whether you are at risk for HIV or not.  Cancer screening tests  Cervical cancer screening: If you have a cervix, begin getting regular cervical cancer screening tests starting at age 21.  Breast cancer scan (mammogram): If you've ever had breasts, begin having regular mammograms starting at age 40. This is a scan to check for breast cancer.  Colon cancer screening: It is important to start screening for colon cancer at age 45.  Have a colonoscopy test every 10 years (or more often if you're at risk) Or, ask your provider about stool tests like a FIT test every year or Cologuard test every 3 years.  To learn more about your testing options, visit:   .  For help making a decision, visit:   https://bit.ly/xc71914.  Prostate cancer screening test: If you have a prostate, ask your care team if a prostate cancer screening test (PSA) at age 55 is right for you.  Lung cancer screening: If you are a current or former smoker ages 50 to 80, ask your care team if ongoing lung cancer screenings are right for you.  For informational purposes only. Not to replace the advice of your health care provider. Copyright   2023 [employer]. All rights reserved. Clinically reviewed by the [facility] Transitions Program. CoreFlow [id number] - REV 01/24.

## 2025-07-22 NOTE — PROGRESS NOTES
Preventive Care Visit  [facility]  Barbara Campoverde MD, Family Medicine  Jul 22, 2025      Assessment & Plan     1. Routine general medical examination at a health care facility (Primary)  Pap smear Up to date, next Pap due April 2026.  Mammogram and colonoscopy referral    2. Hx of cold sores  - valACYclovir (VALTREX) 1000 mg tablet; Take 2 tablets (2,000 mg) by mouth 2 times daily.  Dispense: 30 tablet; Refill: 0  - Comprehensive metabolic panel (BMP + Alb, Alk Phos, ALT, AST, Total. Bili, TP); Future    3. History of bee sting allergy  - EPINEPHrine (ANY BX GENERIC EQUIV) 0.3 MG/0.3ML injection 2-pack; Inject 0.3 mLs (0.3 mg) into the muscle as needed for anaphylaxis. May repeat one time in 5-15 minutes if response to initial dose is inadequate.  Dispense: 2 each; Refill: 1    4. Screening for cardiovascular condition  - Lipid panel reflex to direct LDL Fasting; Future    5. Colon cancer screening  - Colonoscopy Screening  Referral; Future    6. Screening for diabetes mellitus  - Comprehensive metabolic panel (BMP + Alb, Alk Phos, ALT, AST, Total. Bili, TP); Future  - Hemoglobin A1c; Future    7. Perimenopause  8. Hot flashes  - Comprehensive metabolic panel (BMP + Alb, Alk Phos, ALT, AST, Total. Bili, TP); Future  - TSH with free T4 reflex; Future  - Prolactin; Future  - Estradiol; Future  - Follicle stimulating hormone; Future    9. Family history of pheochromocytoma  - Adult Genetics & Metabolism  Referral; Future    Patient has been advised of split billing requirements and indicates understanding: Yes    Counseling  Appropriate preventive services were addressed with this patient via screening, questionnaire, or discussion as appropriate for fall prevention, nutrition, physical activity, Tobacco-use cessation, social engagement, weight loss and cognition.  Checklist reviewing preventive services available has been given to the patient.  Reviewed preventive health  counseling, as reflected in patient instructions    Follow-up   No follow-ups on file.     Follow-up Visit   Expected date:  Jul 22, 2026 (Approximate)      Follow Up Appointment Details:     Follow-up with whom?: PCP    Follow-Up for what?: Adult Preventive    How?: In Person                 Subjective   Carmen is a 45 year old, presenting for the following:  Physical        7/22/2025    11:07 AM   Additional Questions   Roomed by Loly   Accompanied by slf         7/22/2025    11:07 AM   Patient Reported Additional Medications   Patient reports taking the following new medications none          HPI     Wondering about menopause.  Irregular.,  Hot flashes.  Wants hormone labs completed.    Advance Care Planning    Discussed advance care planning with patient; informed AVS has link to [facility].        7/21/2025   General Health   How would you rate your overall physical health? Excellent   Feel stress (tense, anxious, or unable to sleep) Only a little   (!) STRESS CONCERN      7/21/2025   Nutrition   Three or more servings of calcium each day? Yes   Diet: Regular (no restrictions)   How many servings of fruit and vegetables per day? (!) 2-3   How many sweetened beverages each day? 0-1         7/21/2025   Exercise   Days per week of moderate/strenous exercise 5 days   Average minutes spent exercising at this level 40 min         7/21/2025   Social Factors   Frequency of gathering with friends or relatives Once a week   Worry food won't last until get money to buy more No   Food not last or not have enough money for food? No   Do you have housing? (Housing is defined as stable permanent housing and does not include staying outside in a car, in a tent, in an abandoned building, in an overnight shelter, or couch-surfing.) Yes   Are you worried about losing your housing? No   Lack of transportation? No   Unable to get utilities (heat,electricity)? No         7/21/2025   Dental   Dentist two times every year? Yes        Today's PHQ-9 Score:       7/21/2025    10:05 PM   PHQ-9 SCORE   PHQ-9 Total Score MyChart 0   PHQ-9 Total Score 0        Patient-reported         7/21/2025   Substance Use   Alcohol more than 3/day or more than 7/wk No   Do you use any other substances recreationally? No     Social History     Tobacco Use    Smoking status: Never    Smokeless tobacco: Never   Vaping Use    Vaping status: Never Used   Substance Use Topics    Alcohol use: Yes     Comment: occas    Drug use: No           7/14/2025   LAST FHS-7 RESULTS   1st degree relative breast or ovarian cancer No   Any relative bilateral breast cancer No   Any male have breast cancer No   Any ONE woman have BOTH breast AND ovarian cancer No   Any woman with breast cancer before 50yrs No   2 or more relatives with breast AND/OR ovarian cancer No   2 or more relatives with breast AND/OR bowel cancer No        Mammogram Screening - Mammogram every 1-2 years updated in Health Maintenance based on mutual decision making.        7/21/2025   STI Screening   New sexual partner(s) since last STI/HIV test? No     History of abnormal Pap smear: No - age 21 - 65 - Patient with other risk factor requiring more frequent screening - see link Cervical Cytology Screening Guidelines        Latest Ref Rng & Units 2/13/2023    11:07 AM 12/8/2021     3:55 PM 11/30/2020     3:15 PM   PAP / HPV   PAP  Negative for Intraepithelial Lesion or Malignancy (NILM)  Negative for Intraepithelial Lesion or Malignancy (NILM)     HPV 16 DNA Negative Negative  Negative  Negative    HPV 18 DNA Negative Negative  Negative  Negative    Other HR HPV Negative Negative  Negative  Positive      ASCVD Risk   The ASCVD Risk score (Adama THOMASON, et al., 2019) failed to calculate for the following reasons:    Cannot find a previous HDL lab    Cannot find a previous total cholesterol lab        7/21/2025   Contraception/Family Planning   Questions about contraception or family planning No   What are  "your periods like? Not currently having periods        Reviewed and updated as needed this visit by Provider     Meds                BP Readings from Last 3 Encounters:   07/22/25 105/73   07/09/24 116/77   05/25/23 118/78    Wt Readings from Last 3 Encounters:   07/22/25 68.5 kg (151 lb)   07/09/24 69.5 kg (153 lb 4.8 oz)   05/25/23 70.4 kg (155 lb 3.2 oz)                      Review of Systems  Constitutional, HEENT, cardiovascular, pulmonary, GI, , musculoskeletal, neuro, skin, endocrine and psych systems are negative, except as otherwise noted.     Objective    Exam  /73 (BP Location: Left arm, Patient Position: Sitting, Cuff Size: Adult Regular)   Pulse 68   Temp 97.2  F (36.2  C) (Tympanic)   Resp 16   Ht 1.735 m (5' 8.31\")   Wt 68.5 kg (151 lb)   LMP 04/15/2025   SpO2 98%   BMI 22.75 kg/m     Estimated body mass index is 22.75 kg/m  as calculated from the following:    Height as of this encounter: 1.735 m (5' 8.31\").    Weight as of this encounter: 68.5 kg (151 lb).    Physical Exam  GENERAL: alert and no distress  EYES: Eyes grossly normal to inspection, PERRL and conjunctivae and sclerae normal  HENT: ear canals and TM's normal, nose and mouth without ulcers or lesions  NECK: no adenopathy, no asymmetry, masses, or scars  RESP: lungs clear to auscultation - no rales, rhonchi or wheezes  BREAST: normal without masses, tenderness or nipple discharge and no palpable axillary masses or adenopathy  CV: regular rate and rhythm, normal S1 S2, no S3 or S4, no murmur, click or rub, no peripheral edema  ABDOMEN: soft, nontender, no hepatosplenomegaly, no masses and bowel sounds normal  MS: no gross musculoskeletal defects noted, no edema  SKIN: no suspicious lesions or rashes  NEURO: Normal strength and tone, mentation intact and speech normal  PSYCH: mentation appears normal, affect normal/bright        Signed Electronically by: Barbara Campoverde MD    Answers submitted by the patient for this " visit:  Patient Health Questionnaire (Submitted on 7/21/2025)  If you checked off any problems, how difficult have these problems made it for you to do your work, take care of things at home, or get along with other people?: Not difficult at all  PHQ9 TOTAL SCORE: 0  Patient Health Questionnaire (G7) (Submitted on 7/21/2025)  [place] 7 TOTAL SCORE: 0

## 2025-08-12 ENCOUNTER — LAB (OUTPATIENT)
Dept: LAB | Facility: CLINIC | Age: 45
End: 2025-08-12
Payer: COMMERCIAL

## 2025-08-12 DIAGNOSIS — Z86.19 HX OF COLD SORES: ICD-10-CM

## 2025-08-12 DIAGNOSIS — R23.2 HOT FLASHES: ICD-10-CM

## 2025-08-12 DIAGNOSIS — Z13.6 SCREENING FOR CARDIOVASCULAR CONDITION: ICD-10-CM

## 2025-08-12 DIAGNOSIS — Z13.1 SCREENING FOR DIABETES MELLITUS: ICD-10-CM

## 2025-08-12 LAB
ALBUMIN SERPL BCG-MCNC: 4.2 G/DL (ref 3.5–5.2)
ALP SERPL-CCNC: 51 U/L (ref 40–150)
ALT SERPL W P-5'-P-CCNC: 17 U/L (ref 0–50)
ANION GAP SERPL CALCULATED.3IONS-SCNC: 11 MMOL/L (ref 7–15)
AST SERPL W P-5'-P-CCNC: 22 U/L (ref 0–45)
BILIRUB SERPL-MCNC: 1 MG/DL
BUN SERPL-MCNC: 13.6 MG/DL (ref 6–20)
CALCIUM SERPL-MCNC: 9.3 MG/DL (ref 8.8–10.4)
CHLORIDE SERPL-SCNC: 104 MMOL/L (ref 98–107)
CHOLEST SERPL-MCNC: 161 MG/DL
CREAT SERPL-MCNC: 0.84 MG/DL (ref 0.51–0.95)
EGFRCR SERPLBLD CKD-EPI 2021: 87 ML/MIN/1.73M2
EST. AVERAGE GLUCOSE BLD GHB EST-MCNC: 100 MG/DL
ESTRADIOL SERPL-MCNC: 23 PG/ML
FASTING STATUS PATIENT QL REPORTED: YES
FASTING STATUS PATIENT QL REPORTED: YES
FSH SERPL IRP2-ACNC: 99.8 MIU/ML
GLUCOSE SERPL-MCNC: 100 MG/DL (ref 70–99)
HBA1C MFR BLD: 5.1 % (ref 0–5.6)
HCO3 SERPL-SCNC: 25 MMOL/L (ref 22–29)
HDLC SERPL-MCNC: 59 MG/DL
LDLC SERPL CALC-MCNC: 87 MG/DL
NONHDLC SERPL-MCNC: 102 MG/DL
POTASSIUM SERPL-SCNC: 4.2 MMOL/L (ref 3.4–5.3)
PROLACTIN SERPL 3RD IS-MCNC: 21 NG/ML (ref 5–23)
PROT SERPL-MCNC: 6.8 G/DL (ref 6.4–8.3)
SODIUM SERPL-SCNC: 140 MMOL/L (ref 135–145)
TRIGL SERPL-MCNC: 77 MG/DL
TSH SERPL DL<=0.005 MIU/L-ACNC: 2.15 UIU/ML (ref 0.3–4.2)

## 2025-08-12 PROCEDURE — 82670 ASSAY OF TOTAL ESTRADIOL: CPT

## 2025-08-12 PROCEDURE — 80061 LIPID PANEL: CPT

## 2025-08-12 PROCEDURE — 84443 ASSAY THYROID STIM HORMONE: CPT

## 2025-08-12 PROCEDURE — 36415 COLL VENOUS BLD VENIPUNCTURE: CPT

## 2025-08-12 PROCEDURE — 84146 ASSAY OF PROLACTIN: CPT

## 2025-08-12 PROCEDURE — 83001 ASSAY OF GONADOTROPIN (FSH): CPT

## 2025-08-12 PROCEDURE — 80053 COMPREHEN METABOLIC PANEL: CPT

## 2025-08-12 PROCEDURE — 83036 HEMOGLOBIN GLYCOSYLATED A1C: CPT

## 2025-08-13 ENCOUNTER — MYC MEDICAL ADVICE (OUTPATIENT)
Dept: GASTROENTEROLOGY | Facility: CLINIC | Age: 45
End: 2025-08-13
Payer: COMMERCIAL

## 2025-08-13 ENCOUNTER — TELEPHONE (OUTPATIENT)
Dept: GASTROENTEROLOGY | Facility: CLINIC | Age: 45
End: 2025-08-13
Payer: COMMERCIAL

## 2025-08-13 ENCOUNTER — HOSPITAL ENCOUNTER (OUTPATIENT)
Facility: CLINIC | Age: 45
End: 2025-08-13
Attending: COLON & RECTAL SURGERY | Admitting: COLON & RECTAL SURGERY
Payer: COMMERCIAL

## 2025-08-21 ENCOUNTER — TELEPHONE (OUTPATIENT)
Dept: GASTROENTEROLOGY | Facility: CLINIC | Age: 45
End: 2025-08-21
Payer: COMMERCIAL